# Patient Record
Sex: MALE | Race: WHITE | Employment: OTHER | URBAN - METROPOLITAN AREA
[De-identification: names, ages, dates, MRNs, and addresses within clinical notes are randomized per-mention and may not be internally consistent; named-entity substitution may affect disease eponyms.]

---

## 2017-05-11 ENCOUNTER — OFFICE VISIT (OUTPATIENT)
Dept: FAMILY MEDICINE | Facility: CLINIC | Age: 67
End: 2017-05-11
Payer: COMMERCIAL

## 2017-05-11 VITALS
BODY MASS INDEX: 24.11 KG/M2 | WEIGHT: 150 LBS | SYSTOLIC BLOOD PRESSURE: 109 MMHG | HEART RATE: 78 BPM | DIASTOLIC BLOOD PRESSURE: 68 MMHG | OXYGEN SATURATION: 97 % | TEMPERATURE: 97.2 F | HEIGHT: 66 IN

## 2017-05-11 DIAGNOSIS — E78.5 HYPERLIPIDEMIA LDL GOAL <100: ICD-10-CM

## 2017-05-11 DIAGNOSIS — E11.9 DIABETES MELLITUS WITHOUT COMPLICATION (H): ICD-10-CM

## 2017-05-11 DIAGNOSIS — Z13.6 SCREENING FOR ABDOMINAL AORTIC ANEURYSM: ICD-10-CM

## 2017-05-11 DIAGNOSIS — E11.9 TYPE 2 DIABETES MELLITUS WITHOUT COMPLICATION, WITHOUT LONG-TERM CURRENT USE OF INSULIN (H): Primary | ICD-10-CM

## 2017-05-11 LAB
ALT SERPL W P-5'-P-CCNC: 20 U/L (ref 0–70)
ANION GAP SERPL CALCULATED.3IONS-SCNC: 6 MMOL/L (ref 3–14)
AST SERPL W P-5'-P-CCNC: 11 U/L (ref 0–45)
BUN SERPL-MCNC: 13 MG/DL (ref 7–30)
CALCIUM SERPL-MCNC: 9.6 MG/DL (ref 8.5–10.1)
CHLORIDE SERPL-SCNC: 102 MMOL/L (ref 94–109)
CHOLEST SERPL-MCNC: 141 MG/DL
CO2 SERPL-SCNC: 29 MMOL/L (ref 20–32)
CREAT SERPL-MCNC: 0.88 MG/DL (ref 0.66–1.25)
CREAT UR-MCNC: 36 MG/DL
GFR SERPL CREATININE-BSD FRML MDRD: 85 ML/MIN/1.7M2
GLUCOSE SERPL-MCNC: 109 MG/DL (ref 70–99)
HBA1C MFR BLD: 6.1 % (ref 4.3–6)
HDLC SERPL-MCNC: 40 MG/DL
LDLC SERPL CALC-MCNC: 67 MG/DL
MICROALBUMIN UR-MCNC: <5 MG/L
MICROALBUMIN/CREAT UR: NORMAL MG/G CR (ref 0–17)
NONHDLC SERPL-MCNC: 101 MG/DL
POTASSIUM SERPL-SCNC: 3.9 MMOL/L (ref 3.4–5.3)
SODIUM SERPL-SCNC: 137 MMOL/L (ref 133–144)
TRIGL SERPL-MCNC: 170 MG/DL

## 2017-05-11 PROCEDURE — 84450 TRANSFERASE (AST) (SGOT): CPT | Performed by: FAMILY MEDICINE

## 2017-05-11 PROCEDURE — 80061 LIPID PANEL: CPT | Performed by: FAMILY MEDICINE

## 2017-05-11 PROCEDURE — 83036 HEMOGLOBIN GLYCOSYLATED A1C: CPT | Performed by: FAMILY MEDICINE

## 2017-05-11 PROCEDURE — 99214 OFFICE O/P EST MOD 30 MIN: CPT | Performed by: FAMILY MEDICINE

## 2017-05-11 PROCEDURE — 82043 UR ALBUMIN QUANTITATIVE: CPT | Performed by: FAMILY MEDICINE

## 2017-05-11 PROCEDURE — 84460 ALANINE AMINO (ALT) (SGPT): CPT | Performed by: FAMILY MEDICINE

## 2017-05-11 PROCEDURE — 80048 BASIC METABOLIC PNL TOTAL CA: CPT | Performed by: FAMILY MEDICINE

## 2017-05-11 PROCEDURE — 36415 COLL VENOUS BLD VENIPUNCTURE: CPT | Performed by: FAMILY MEDICINE

## 2017-05-11 RX ORDER — SIMVASTATIN 40 MG
40 TABLET ORAL AT BEDTIME
Qty: 90 TABLET | Refills: 3 | Status: SHIPPED | OUTPATIENT
Start: 2017-05-11 | End: 2018-05-14

## 2017-05-11 RX ORDER — LISINOPRIL AND HYDROCHLOROTHIAZIDE 12.5; 2 MG/1; MG/1
1 TABLET ORAL DAILY
Qty: 90 TABLET | Refills: 3 | Status: SHIPPED | OUTPATIENT
Start: 2017-05-11 | End: 2018-05-03

## 2017-05-11 NOTE — MR AVS SNAPSHOT
After Visit Summary   5/11/2017    Tyson Reese    MRN: [id number]           Patient Information     Date Of Birth          1950        Visit Information        Provider Department      5/11/2017 1:00 PM Edd Carvalho MD [facility]        Today's Diagnoses     Type 2 diabetes mellitus without complication, without long-term current use of insulin (H)    -  1    Hyperlipidemia LDL goal <100        Diabetes mellitus without complication (H)        Screening for abdominal aortic aneurysm           Follow-ups after your visit        Additional Services     OPTOMETRY REFERRAL       Your provider has referred you to: [facility]: [facility] [phone]    [email or web address]    Please be aware that coverage of these services is subject to the terms and limitations of your health insurance plan.  Call member services at your health plan with any benefit or coverage questions.      Please bring the following with you to your appointment:    (1) Any X-Rays, CTs or MRIs which have been performed.  Contact the facility where they were done to arrange for  prior to your scheduled appointment.    (2) List of current medications  (3) This referral request   (4) Any documents/labs given to you for this referral                  Your next 10 appointments already scheduled     Jun 06, 2017 10:40 AM CDT   US ABDOMINAL AORTIC LIMITED with [id number]   [facility]    [address] [place] [address] [phone]           Please bring a list of your medicines (including vitamins, minerals and over-the-counter drugs). Also, tell your doctor about any allergies you may have. Wear comfortable clothes and leave your valuables at home.  Adults: No eating or drinking for 8 hours before the exam. You may take medicine with a small sip of water.  Children: - Children 6+ years: No food or drink  for 6 hours before exam. - Children 1-5 years: No food or drink for 4 hours before exam. - Infants, breast-fed: may have breast milk up to 2 hours before exam. - Infants, formula: may have bottle until 4 hours before exam.  Please call the Imaging Department at your exam site with any questions.            Jun 06, 2017 11:30 AM CDT   [facility] Visit with Heather Wade OD   [facility]    [address] [place] [id number] [phone]            Nov 13, 2017  1:20 PM CST   Office Visit with Edd Carvalho MD   [facility]    [address] [facility] [address] [phone]           Bring a current list of meds and any records pertaining to this visit.  For Physicals, please bring immunization records and any forms needing to be filled out.  Please arrive 10 minutes early to complete paperwork.              Future tests that were ordered for you today     Open Future Orders        Priority Expected Expires Ordered    US abdominal aorta limited Routine  5/11/2018 5/11/2017            Who to contact     If you have questions or need follow up information about today's clinic visit or your schedule please contact [facility] directly at [phone].  Normal or non-critical lab and imaging results will be communicated to you by MyChart, letter or phone within 4 business days after the clinic has received the results. If you do not hear from us within 7 days, please contact the clinic through MyChart or phone. If you have a critical or abnormal lab result, we will notify you by phone as soon as possible.  Submit refill requests through [employer] or call your pharmacy and they will forward the refill request to us. Please allow 3 business days for your refill to be completed.          Additional Information About Your Visit        [employer] Information      "[employer] lets you send messages to your doctor, view your test results, renew your prescriptions, schedule appointments and more. To sign up, go to [email or web address] . Click on \"Log in\" on the left side of the screen, which will take you to the Welcome page. Then click on \"Sign up Now\" on the right side of the page.     You will be asked to enter the access code listed below, as well as some personal information. Please follow the directions to create your username and password.     Your access code is: [email or web address]  Expires: 2017  1:50 PM     Your access code will  in 90 days. If you need help or a new code, please call your [place] clinic or [phone].        Care EveryWhere ID     This is your Care EveryWhere ID. This could be used by other organizations to access your [place] medical records  [id number]        Your Vitals Were     Pulse Temperature Height Pulse Oximetry BMI (Body Mass Index)       78 97.2  F (36.2  C) (Oral) 5' 5.55\" (1.665 m) 97% 24.54 kg/m2        Blood Pressure from Last 3 Encounters:   17 109/68   11/10/16 129/80   16 109/73    Weight from Last 3 Encounters:   17 150 lb (68 kg)   11/10/16 149 lb (67.6 kg)   16 152 lb (68.9 kg)              We Performed the Following     Albumin Random Urine Quantitative     ALT     AST     Basic metabolic panel     Hemoglobin A1c     Lipid panel reflex to direct LDL     OPTOMETRY REFERRAL          Where to get your medicines      These medications were sent to [id number] - [place], [place] - [address]  [address] [facility] [address]     Phone:  [phone]     lisinopril-hydrochlorothiazide 20-12.5 MG per tablet    metFORMIN 500 MG tablet    simvastatin 40 MG tablet          Primary Care Provider Office Phone # Fax #    Edd Carvalho [phone]       [facility] [address] [facility] [address]        Thank you!     " Thank you for choosing [facility]  for your care. Our goal is always to provide you with excellent care. Hearing back from our patients is one way we can continue to improve our services. Please take a few minutes to complete the written survey that you may receive in the mail after your visit with us. Thank you!             Your Updated Medication List - Protect others around you: Learn how to safely use, store and throw away your medicines at www.disposemymeds.org.          This list is accurate as of: 5/11/17  1:50 PM.  Always use your most recent med list.                   Brand Name Dispense Instructions for use    aspirin 81 MG EC tablet      Take 81 mg by mouth daily.       lisinopril-hydrochlorothiazide 20-12.5 MG per tablet    PRINZIDE/ZESTORETIC    90 tablet    Take 1 tablet by mouth daily       metFORMIN 500 MG tablet    GLUCOPHAGE    180 tablet    Take 1 tablet (500 mg) by mouth 2 times daily (with meals)       simvastatin 40 MG tablet    ZOCOR    90 tablet    Take 1 tablet (40 mg) by mouth At Bedtime

## 2017-05-11 NOTE — NURSING NOTE
"Chief Complaint   Patient presents with     Diabetes     Follow up       Initial /68 (BP Location: Left arm, Patient Position: Chair, Cuff Size: Adult Regular)  Pulse 78  Temp 97.2  F (36.2  C) (Oral)  Ht 5' 5.55\" (1.665 m)  Wt 150 lb (68 kg)  SpO2 97%  BMI 24.54 kg/m2 Estimated body mass index is 24.54 kg/(m^2) as calculated from the following:    Height as of this encounter: 5' 5.55\" (1.665 m).    Weight as of this encounter: 150 lb (68 kg).  Medication Reconciliation: complete   Akua See STEFAN Hurley      "

## 2017-05-11 NOTE — PROGRESS NOTES
"  SUBJECTIVE:                                                    Tyson Reese is a 67 year old male who presents to clinic today for the following health issues:        Diabetes Follow-up      Patient is checking blood sugars: not at all    Diabetic concerns: None     Symptoms of hypoglycemia (low blood sugar): none     Paresthesias (numbness or burning in feet) or sores: No     Date of last diabetic eye exam: 2016       Amount of exercise or physical activity: Couple times a week    Problems taking medications regularly: No    Medication side effects: none    Diet: regular (no restrictions)    Ros: no chest pain, chest tightness   No sob   No leg edema   No abdominal pain     Denies fever or chills   Weight stable     No claudication   His left foot hurts at times, but the pain goes away     Current Outpatient Prescriptions   Medication Sig Dispense Refill     simvastatin (ZOCOR) 40 MG tablet Take 1 tablet (40 mg) by mouth At Bedtime 90 tablet 3     metFORMIN (GLUCOPHAGE) 500 MG tablet Take 1 tablet (500 mg) by mouth 2 times daily (with meals) 180 tablet 3     lisinopril-hydrochlorothiazide (PRINZIDE,ZESTORETIC) 20-12.5 MG per tablet Take 1 tablet by mouth daily 90 tablet 3     aspirin 81 MG EC tablet Take 81 mg by mouth daily.       Smokes 1 ppd   Patient declines ct screening for cancer of the lung     O: /68 (BP Location: Left arm, Patient Position: Chair, Cuff Size: Adult Regular)  Pulse 78  Temp 97.2  F (36.2  C) (Oral)  Ht 5' 5.55\" (1.665 m)  Wt 150 lb (68 kg)  SpO2 97%  BMI 24.54 kg/m2    Head: Normocephalic, atraumatic.  Eyes: Conjunctiva clear, non icteric. PERRLA.  Ears: External ears and TMs normal BL.  Nose: Septum midline, nasal mucosa pink and moist. No discharge.  Mouth / Throat: Normal dentition.  No oral lesions. Pharynx non erythematous, tonsils without hypertrophy.  Neck: Supple, no enlarged LN, trachea midline.    No bruits     Chest wall normal to inspection and palpation. Good " excursion bilaterally. Lungs clear to auscultation. Good air movement bilaterally without rales, wheezes, or rhonchi.   Regular rate and  rhythm. S1 and S2 normal, no murmurs, clicks, gallops or rubs. No edema or JVD.    The abdomen is soft without tenderness, guarding, mass, rebound or organomegaly. Bowel sounds are normal. No CVA tenderness or inguinal adenopathy noted.        ICD-10-CM    1. Type 2 diabetes mellitus without complication, without long-term current use of insulin (H) E11.9 Basic metabolic panel     Albumin Random Urine Quantitative     Lipid panel reflex to direct LDL     Hemoglobin A1c     OPTOMETRY REFERRAL   2. Hyperlipidemia LDL goal <100 E78.5 Lipid panel reflex to direct LDL     ALT     AST     simvastatin (ZOCOR) 40 MG tablet   3. Diabetes mellitus without complication (H) E11.9 metFORMIN (GLUCOPHAGE) 500 MG tablet     lisinopril-hydrochlorothiazide (PRINZIDE/ZESTORETIC) 20-12.5 MG per tablet   4. Screening for abdominal aortic aneurysm Z13.6 US abdominal aorta limited     Patient will get his optometry appointment and his AAA screen on the same day     6 month follow up for diabetes     Reviewed and updated as needed this visit by clinical staff  Tobacco  Allergies  Meds  Med Hx  Surg Hx  Fam Hx  Soc Hx      Reviewed and updated as needed this visit by Provider

## 2017-05-11 NOTE — LETTER
[facility]  [address] [place]  [address]  [phone]        May 12, 2017    Tyson Reese  [address] [facility] [address]        Dear Tyson,    Your Hemoglobin A1C shows good control of your diabetes   Your basic metabolic panel which includes electrolytes,kidney function is normal and  -Glucose (diabetic screening test) is mildly elevated   Your cholesterols are at goal     Your liver tests are normal       Follow up in 6 month(s)     Results for orders placed or performed in visit on 05/11/17   Basic metabolic panel   Result Value Ref Range    Sodium 137 133 - 144 mmol/L    Potassium 3.9 3.4 - 5.3 mmol/L    Chloride 102 94 - 109 mmol/L    Carbon Dioxide 29 20 - 32 mmol/L    Anion Gap 6 3 - 14 mmol/L    Glucose 109 (H) 70 - 99 mg/dL    Urea Nitrogen 13 7 - 30 mg/dL    Creatinine 0.88 0.66 - 1.25 mg/dL    GFR Estimate 85 >60 mL/min/1.7m2    GFR Estimate If Black >90   GFR Calc   >60 mL/min/1.7m2    Calcium 9.6 8.5 - 10.1 mg/dL   Albumin Random Urine Quantitative   Result Value Ref Range    Creatinine Urine 36 mg/dL    Albumin Urine mg/L <5 mg/L    Albumin Urine mg/g Cr Unable to calculate due to low value 0 - 17 mg/g Cr   Lipid panel reflex to direct LDL   Result Value Ref Range    Cholesterol 141 <200 mg/dL    Triglycerides 170 (H) <150 mg/dL    HDL Cholesterol 40 >39 mg/dL    LDL Cholesterol Calculated 67 <100 mg/dL    Non HDL Cholesterol 101 <130 mg/dL   ALT   Result Value Ref Range    ALT 20 0 - 70 U/L   AST   Result Value Ref Range    AST 11 0 - 45 U/L   Hemoglobin A1c   Result Value Ref Range    Hemoglobin A1C 6.1 (H) 4.3 - 6.0 %       If you have any questions please call the clinic at [phone].    Sincerely,    Edd STRONGL

## 2017-05-12 NOTE — PROGRESS NOTES
Your Hemoglobin A1C shows good control of your diabetes   Your basic metabolic panel which includes electrolytes,kidney function is normal and  -Glucose (diabetic screening test) is mildly elevated  Your cholesterols are at goal     Your liver tests are normal         Follow up in 6 month(s)

## 2017-05-29 DIAGNOSIS — E78.5 HYPERLIPIDEMIA LDL GOAL <100: ICD-10-CM

## 2017-05-29 DIAGNOSIS — E11.9 DIABETES MELLITUS WITHOUT COMPLICATION (H): ICD-10-CM

## 2017-05-30 RX ORDER — SIMVASTATIN 40 MG
TABLET ORAL
Qty: 90 TABLET | Refills: 3 | OUTPATIENT
Start: 2017-05-30

## 2017-05-30 RX ORDER — LISINOPRIL AND HYDROCHLOROTHIAZIDE 12.5; 2 MG/1; MG/1
TABLET ORAL
Qty: 90 TABLET | Refills: 3 | OUTPATIENT
Start: 2017-05-30

## 2017-05-30 NOTE — TELEPHONE ENCOUNTER
simvastatin (ZOCOR) 40 MG tablet     Last Written Prescription Date: 5/11/17  Last Fill Quantity: 90, # refills: 3  Last [facility] with [facility] prescribing provider: 5/11/17       Lab Results   Component Value Date    CHOL 141 05/11/2017     Lab Results   Component Value Date    HDL 40 05/11/2017     Lab Results   Component Value Date    LDL 67 05/11/2017     Lab Results   Component Value Date    TRIG 170 05/11/2017     Lab Results   Component Value Date    CHOLHDLRATIO 3.0 11/09/2015     lisinopril-hydrochlorothiazide (PRINZIDE/ZESTORETIC) 20-12.5 MG per tablet      Last Written Prescription Date: 5/11/17  Last Fill Quantity: 90, # refills: 3  Last Office Visit with [facility] or [facility] prescribing provider: 5/11/17       Potassium   Date Value Ref Range Status   05/11/2017 3.9 3.4 - 5.3 mmol/L Final     Creatinine   Date Value Ref Range Status   05/11/2017 0.88 0.66 - 1.25 mg/dL Final     BP Readings from Last 3 Encounters:   05/11/17 109/68   11/10/16 129/80   05/09/16 109/73

## 2017-06-06 ENCOUNTER — OFFICE VISIT (OUTPATIENT)
Dept: OPTOMETRY | Facility: CLINIC | Age: 67
End: 2017-06-06
Payer: COMMERCIAL

## 2017-06-06 ENCOUNTER — RADIANT APPOINTMENT (OUTPATIENT)
Dept: ULTRASOUND IMAGING | Facility: CLINIC | Age: 67
End: 2017-06-06
Attending: FAMILY MEDICINE
Payer: COMMERCIAL

## 2017-06-06 DIAGNOSIS — Z13.6 SCREENING FOR ABDOMINAL AORTIC ANEURYSM: ICD-10-CM

## 2017-06-06 DIAGNOSIS — H26.9 CATARACTS, BOTH EYES: ICD-10-CM

## 2017-06-06 DIAGNOSIS — H52.13 MYOPIA WITH PRESBYOPIA OF BOTH EYES: ICD-10-CM

## 2017-06-06 DIAGNOSIS — H52.202 ASTIGMATISM OF LEFT EYE: ICD-10-CM

## 2017-06-06 DIAGNOSIS — D31.31 CHOROIDAL NEVUS OF RIGHT EYE: ICD-10-CM

## 2017-06-06 DIAGNOSIS — H52.4 MYOPIA WITH PRESBYOPIA OF BOTH EYES: ICD-10-CM

## 2017-06-06 DIAGNOSIS — E11.9 TYPE 2 DIABETES MELLITUS WITHOUT RETINOPATHY (H): ICD-10-CM

## 2017-06-06 DIAGNOSIS — Z01.00 EXAMINATION OF EYES AND VISION: Primary | ICD-10-CM

## 2017-06-06 DIAGNOSIS — H02.826 CYST OF LEFT EYELID: ICD-10-CM

## 2017-06-06 PROCEDURE — 76775 US EXAM ABDO BACK WALL LIM: CPT

## 2017-06-06 PROCEDURE — 92015 DETERMINE REFRACTIVE STATE: CPT | Performed by: OPTOMETRIST

## 2017-06-06 PROCEDURE — 92014 COMPRE OPH EXAM EST PT 1/>: CPT | Performed by: OPTOMETRIST

## 2017-06-06 ASSESSMENT — CONF VISUAL FIELD
OS_NORMAL: 1
OD_NORMAL: 1

## 2017-06-06 ASSESSMENT — VISUAL ACUITY
OS_SC: 20/60
OD_SC: 20/40
CORRECTION_TYPE: GLASSES
OS_SC: 20/40
OD_CC: 20/30
METHOD: SNELLEN - LINEAR
OD_SC+: -1
OS_CC: 20/30 -2
OS_CC: 20/30
OD_CC: 20/30 -2
OD_SC: 20/40

## 2017-06-06 ASSESSMENT — REFRACTION_WEARINGRX
OS_ADD: +2.75
OD_CYLINDER: +0.25
OS_AXIS: 162
OS_CYLINDER: +0.50
OS_SPHERE: -0.75
OD_ADD: +2.75
OD_AXIS: 109
SPECS_TYPE: BIFOCAL
OD_SPHERE: -1.00

## 2017-06-06 ASSESSMENT — EXTERNAL EXAM - RIGHT EYE: OD_EXAM: NORMAL

## 2017-06-06 ASSESSMENT — TONOMETRY
OS_IOP_MMHG: 18
OD_IOP_MMHG: 18
IOP_METHOD: APPLANATION

## 2017-06-06 ASSESSMENT — REFRACTION_MANIFEST
OD_ADD: +2.50
OS_AXIS: 160
OS_CYLINDER: +0.75
OD_SPHERE: -1.00
OS_ADD: +2.50
OD_CYLINDER: SPHERE
OS_SPHERE: -0.75

## 2017-06-06 ASSESSMENT — CUP TO DISC RATIO
OS_RATIO: 0.1
OD_RATIO: 0.1

## 2017-06-06 ASSESSMENT — SLIT LAMP EXAM - LIDS: COMMENTS: 2+ DERMATOCHALASIS - UPPER LID

## 2017-06-06 ASSESSMENT — EXTERNAL EXAM - LEFT EYE: OS_EXAM: NORMAL

## 2017-06-06 NOTE — LETTER
[facility]  Optometry Department      6/6/2017    Re:  Tyson Reese  1950   [id number]    Dear Dr. Carvalho,    Your patient was seen in our office on 6/6/2017 for a dilated diabetic eye exam.      Findings:    No Retinopathy  OU - Both  Mild cataracts both eyes   Choroidal nevus of right eye  Cyst of left eyelid, lower    Comments:  Tyson should return for a comprehensive eye exam in 1 year.      Sincerely,        Heather Wade O.D  [address] [place]  [address]    [phone]

## 2017-06-06 NOTE — MR AVS SNAPSHOT
After Visit Summary   6/6/2017    Tyson Reese    MRN: [id number]           Patient Information     Date Of Birth          1950        Visit Information        Provider Department      6/6/2017 11:30 AM Heather Wade OD [facility]        Today's Diagnoses     Examination of eyes and vision    -  1    Type 2 diabetes mellitus without retinopathy (H)        Cataracts, both eyes        Choroidal nevus of right eye        Cyst of left eyelid        Myopia with presbyopia of both eyes        Astigmatism of left eye          Care Instructions        Monitor, Keep blood sugar under control  Sent letter to primary care provider regarding diabetes  Monitor cataracts and choroidal nevus by having yearly exams.  Wear sunglasses when outside.  A final glasses prescription was given.   No need to change the glasses.  Return to clinic 1 year for Comprehensive Vision Exam      Heather Wade O.D  [address]  [place]  [address] [phone]                Follow-ups after your visit        Follow-up notes from your care team     Return in about 1 year (around 6/6/2018) for Eye Exam.      Your next 10 appointments already scheduled     Nov 13, 2017  1:20 PM CST   [facility]    [address] [facility] [address] [phone]           Bring a current list of meds and any records pertaining to this visit.  For Physicals, please bring immunization records and any forms needing to be filled out.  Please arrive 10 minutes early to complete paperwork.              Who to contact     If you have questions or need follow up information about today's clinic visit or your schedule please contact [facility] directly at [phone].  Normal or non-critical lab and imaging results will be communicated to you by  "MyChart, letter or phone within 4 business days after the clinic has received the results. If you do not hear from us within 7 days, please contact the clinic through [employer] or phone. If you have a critical or abnormal lab result, we will notify you by phone as soon as possible.  Submit refill requests through [employer] or call your pharmacy and they will forward the refill request to us. Please allow 3 business days for your refill to be completed.          Additional Information About Your Visit        [employer] Information     [employer] lets you send messages to your doctor, view your test results, renew your prescriptions, schedule appointments and more. To sign up, go to [email or web address] . Click on \"Log in\" on the left side of the screen, which will take you to the Welcome page. Then click on \"Sign up Now\" on the right side of the page.     You will be asked to enter the access code listed below, as well as some personal information. Please follow the directions to create your username and password.     Your access code is: [email or web address]  Expires: 2017  1:50 PM     Your access code will  in 90 days. If you need help or a new code, please call your [place] clinic or [phone].        Care EveryWhere ID     This is your Care EveryWhere ID. This could be used by other organizations to access your [place] medical records  [id number]         Blood Pressure from Last 3 Encounters:   17 109/68   11/10/16 129/80   16 109/73    Weight from Last 3 Encounters:   17 68 kg (150 lb)   11/10/16 67.6 kg (149 lb)   16 68.9 kg (152 lb)              We Performed the Following     EYE EXAM (SIMPLE-NONBILLABLE)     REFRACTION        Primary Care Provider Office Phone # Fax #    Edd Carvalho [phone]       [facility] [address] [facility] [address]        Thank you!     Thank you for choosing [facility]  for your care. " Our goal is always to provide you with excellent care. Hearing back from our patients is one way we can continue to improve our services. Please take a few minutes to complete the written survey that you may receive in the mail after your visit with us. Thank you!             Your Updated Medication List - Protect others around you: Learn how to safely use, store and throw away your medicines at www.disposemymeds.org.          This list is accurate as of: 6/6/17 12:33 PM.  Always use your most recent med list.                   Brand Name Dispense Instructions for use    aspirin 81 MG EC tablet      Take 81 mg by mouth daily.       lisinopril-hydrochlorothiazide 20-12.5 MG per tablet    PRINZIDE/ZESTORETIC    90 tablet    Take 1 tablet by mouth daily       metFORMIN 500 MG tablet    GLUCOPHAGE    180 tablet    Take 1 tablet (500 mg) by mouth 2 times daily (with meals)       simvastatin 40 MG tablet    ZOCOR    90 tablet    Take 1 tablet (40 mg) by mouth At Bedtime

## 2017-06-06 NOTE — LETTER
[facility]   [address] [place] [place]  [address]  [phone]                                   June 6, 2017    Tyson Reese  [address] [facility] [address]        Dear Tyson,    Your ultrasound of your aorta is normal     Results for orders placed or performed in visit on 06/06/17   US abdominal aorta limited    Narrative    ULTRASOUND  OF THE ABDOMINAL AORTA  6/6/2017 11:05 AM     HISTORY: Encounter for screening for cardiovascular disorders    COMPARISON: None.    FINDINGS:  The abdominal aorta is normal in caliber with no aneurysm.   The common iliac arteries are normal in caliber with no aneurysm.      Impression    IMPRESSION:  Normal ultrasound of the abdominal aorta.  Maximum  diameter of the abdominal aorta is 2.7 cm.    MELISSA HENRY MD       If you have any questions please call the clinic at [phone]    Sincerely,    Edd Carvalho MD  bmd

## 2017-06-06 NOTE — PATIENT INSTRUCTIONS
Monitor, Keep blood sugar under control  Sent letter to primary care provider regarding diabetes  Monitor cataracts and choroidal nevus by having yearly exams.  Wear sunglasses when outside.  A final glasses prescription was given.   No need to change the glasses.  Return to clinic 1 year for Comprehensive Vision Exam      Heather Wade O.D  [address] [facility] [place]  [address]    [phone]

## 2017-06-06 NOTE — PROGRESS NOTES
Chief Complaint   Patient presents with     Diabetic Eye Exam     Accompanied by self  Lab Results   Component Value Date    A1C 6.1 05/11/2017    A1C 6.1 11/10/2016    A1C 6.1 05/09/2016    A1C 6.4 11/09/2015    A1C 6.4 05/11/2015       Last Eye Exam: 1 year ago  Dilated Previously: Yes    What are you currently using to see?  glasses    Distance Vision Acuity: Satisfied with vision    Near Vision Acuity: Satisfied with vision while reading  with glasses    Eye Comfort: dry and itchy  Do you use eye drops? : No  Occupation or Hobbies: retired    Zoe Lion, [employer]     Medical, surgical and family histories reviewed and updated 6/6/2017.       OBJECTIVE: See Ophthalmology exam    ASSESSMENT:    ICD-10-CM    1. Examination of eyes and vision Z01.00 EYE EXAM (SIMPLE-NONBILLABLE)     REFRACTION   2. Type 2 diabetes mellitus without retinopathy (H) E11.9 EYE EXAM (SIMPLE-NONBILLABLE)   3. Cataracts, both eyes H26.9 EYE EXAM (SIMPLE-NONBILLABLE)   4. Choroidal nevus of right eye D31.31 EYE EXAM (SIMPLE-NONBILLABLE)   5. Cyst of left eyelid H02.826 EYE EXAM (SIMPLE-NONBILLABLE)   6. Myopia with presbyopia of both eyes H52.13 EYE EXAM (SIMPLE-NONBILLABLE)    H52.4 REFRACTION   7. Astigmatism of left eye H52.202 EYE EXAM (SIMPLE-NONBILLABLE)     REFRACTION      PLAN:  Monitor, Keep blood sugar under control  Sent letter to primary care provider regarding diabetes.  Tyson hubbard  eye exam results will be sent to Edd Carvalho  Monitor cataracts and choroidal nevus by having yearly exams.  Wear sunglasses when outside.  A final glasses prescription was given.  No need to change the glasses.  Return to clinic 1 year for Comprehensive Vision Exam      Heather Wade O.D  [facility]  [address] [place] [address]. [place]  [address]    [phone]

## 2017-11-13 ENCOUNTER — OFFICE VISIT (OUTPATIENT)
Dept: FAMILY MEDICINE | Facility: CLINIC | Age: 67
End: 2017-11-13
Payer: COMMERCIAL

## 2017-11-13 VITALS
BODY MASS INDEX: 24.54 KG/M2 | SYSTOLIC BLOOD PRESSURE: 124 MMHG | OXYGEN SATURATION: 98 % | TEMPERATURE: 96.6 F | HEART RATE: 64 BPM | WEIGHT: 150 LBS | DIASTOLIC BLOOD PRESSURE: 77 MMHG

## 2017-11-13 DIAGNOSIS — E11.8 TYPE 2 DIABETES MELLITUS WITH COMPLICATION, WITHOUT LONG-TERM CURRENT USE OF INSULIN (H): Primary | ICD-10-CM

## 2017-11-13 LAB
ANION GAP SERPL CALCULATED.3IONS-SCNC: 15 MMOL/L (ref 3–14)
BUN SERPL-MCNC: 18 MG/DL (ref 7–30)
CALCIUM SERPL-MCNC: 8.9 MG/DL (ref 8.5–10.1)
CHLORIDE SERPL-SCNC: 100 MMOL/L (ref 94–109)
CO2 SERPL-SCNC: 22 MMOL/L (ref 20–32)
CREAT SERPL-MCNC: 1 MG/DL (ref 0.66–1.25)
GFR SERPL CREATININE-BSD FRML MDRD: 74 ML/MIN/1.7M2
GLUCOSE SERPL-MCNC: 89 MG/DL (ref 70–99)
HBA1C MFR BLD: 5.8 % (ref 4.3–6)
POTASSIUM SERPL-SCNC: 4.2 MMOL/L (ref 3.4–5.3)
SODIUM SERPL-SCNC: 137 MMOL/L (ref 133–144)
TSH SERPL DL<=0.005 MIU/L-ACNC: 1 MU/L (ref 0.4–4)

## 2017-11-13 PROCEDURE — 84443 ASSAY THYROID STIM HORMONE: CPT | Performed by: FAMILY MEDICINE

## 2017-11-13 PROCEDURE — 99214 OFFICE O/P EST MOD 30 MIN: CPT | Performed by: FAMILY MEDICINE

## 2017-11-13 PROCEDURE — 80048 BASIC METABOLIC PNL TOTAL CA: CPT | Performed by: FAMILY MEDICINE

## 2017-11-13 PROCEDURE — 99207 C FOOT EXAM  NO CHARGE: CPT | Mod: 25 | Performed by: FAMILY MEDICINE

## 2017-11-13 PROCEDURE — 83036 HEMOGLOBIN GLYCOSYLATED A1C: CPT | Performed by: FAMILY MEDICINE

## 2017-11-13 PROCEDURE — 36415 COLL VENOUS BLD VENIPUNCTURE: CPT | Performed by: FAMILY MEDICINE

## 2017-11-13 NOTE — NURSING NOTE
"Chief Complaint   Patient presents with     Diabetes     Follow up       Initial /77 (BP Location: Right arm, Patient Position: Sitting, Cuff Size: Adult Regular)  Pulse 64  Temp 96.6  F (35.9  C) (Oral)  Wt 150 lb (68 kg)  SpO2 98%  BMI 24.54 kg/m2 Estimated body mass index is 24.54 kg/(m^2) as calculated from the following:    Height as of 5/11/17: 5' 5.55\" (1.665 m).    Weight as of this encounter: 150 lb (68 kg).  Medication Reconciliation: complete   Maria Ines Zapata MA      "

## 2017-11-13 NOTE — LETTER
[facility]   [address] [facility] [place]  [address]  [phone]                                   November 14, 2017    Tyson Reese  [address] [facility] [address]        Dear Tyson,    Your Hemoglobin A1C is in the normal range   Your thyroid is normal   Your basic metabolic panel which includes electrolytes,kidney function is normal and  -Glucose (diabetic screening test) is within normal limits    Follow up in 6 month(s)    Results for orders placed or performed in visit on 11/13/17   Basic metabolic panel   Result Value Ref Range    Sodium 137 133 - 144 mmol/L    Potassium 4.2 3.4 - 5.3 mmol/L    Chloride 100 94 - 109 mmol/L    Carbon Dioxide 22 20 - 32 mmol/L    Anion Gap 15 (H) 3 - 14 mmol/L    Glucose 89 70 - 99 mg/dL    Urea Nitrogen 18 7 - 30 mg/dL    Creatinine 1.00 0.66 - 1.25 mg/dL    GFR Estimate 74 >60 mL/min/1.7m2    GFR Estimate If Black 90 >60 mL/min/1.7m2    Calcium 8.9 8.5 - 10.1 mg/dL   Hemoglobin A1c   Result Value Ref Range    Hemoglobin A1C 5.8 4.3 - 6.0 %   TSH with free T4 reflex   Result Value Ref Range    TSH 1.00 0.40 - 4.00 mU/L       If you have any questions please call the clinic at [phone]    Sincerely,    Edd Carvalho MD  bmd

## 2017-11-13 NOTE — PROGRESS NOTES
SUBJECTIVE:   Tyson Reese is a 67 year old male who presents to clinic today for the following health issues:    Diabetes Follow-up      Patient is checking blood sugars: not at all    Diabetic concerns: None     Symptoms of hypoglycemia (low blood sugar): none     Paresthesias (numbness or burning in feet) or sores: No     Date of last diabetic eye exam: 6/6/17        Amount of exercise or physical activity: 3-4 days/week for about a mile    Problems taking medications regularly: No    Medication side effects: none  Diet: regular (no restrictions)    Current Outpatient Prescriptions   Medication Sig Dispense Refill     simvastatin (ZOCOR) 40 MG tablet Take 1 tablet (40 mg) by mouth At Bedtime 90 tablet 3     metFORMIN (GLUCOPHAGE) 500 MG tablet Take 1 tablet (500 mg) by mouth 2 times daily (with meals) 180 tablet 3     lisinopril-hydrochlorothiazide (PRINZIDE/ZESTORETIC) 20-12.5 MG per tablet Take 1 tablet by mouth daily 90 tablet 3     aspirin 81 MG EC tablet Take 81 mg by mouth daily.        nocturia x2     Ros: no chest pain, chest tightness   No sob   No leg edema   No abdominal pain     Denies fever or chills   Weight stable     Hard of hearing   Patient smokes 2-3 cigarettes per day   He states his 2 brothers smoke and they live with him       Patient states he is trying to quit   He has tried nicotine gum   Given another card to pick this up     O: /77 (BP Location: Right arm, Patient Position: Sitting, Cuff Size: Adult Regular)  Pulse 64  Temp 96.6  F (35.9  C) (Oral)  Wt 150 lb (68 kg)  SpO2 98%  BMI 24.54 kg/m2    Patient is hard of hearing     Patient smells of smoke     No bruits in the neck       Chest wall normal to inspection and palpation. Good excursion bilaterally. Lungs clear to auscultation. Good air movement bilaterally without rales, wheezes, or rhonchi.   Regular rate and  rhythm. S1 and S2 normal, no murmurs, clicks, gallops or rubs. No edema or JVD.     normal DP and PT  pulses, no trophic changes or ulcerative lesions, normal sensory exam and normal monofilament exam       ICD-10-CM    1. Type 2 diabetes mellitus with complication, without long-term current use of insulin (H) E11.8 Basic metabolic panel     Hemoglobin A1c     TSH with free T4 reflex     FOOT EXAM       Patient has enough medication refills to last until 5/2018  This is when we should check him next

## 2017-11-13 NOTE — MR AVS SNAPSHOT
After Visit Summary   11/13/2017    Tyson Reese    MRN: [id number]           Patient Information     Date Of Birth          1950        Visit Information        Provider Department      11/13/2017 1:20 PM Edd Carvalho MD [facility]        Today's Diagnoses     Type 2 diabetes mellitus with complication, without long-term current use of insulin (H)    -  1       Follow-ups after your visit        Follow-up notes from your care team     Return in about 6 months (around 5/13/2018).      Your next 10 appointments already scheduled     May 14, 2018  1:20 PM CDT   Office Visit with Edd Carvalho MD   [facility]    [address] [place] [id number] [phone]           Bring a current list of meds and any records pertaining to this visit. For Physicals, please bring immunization records and any forms needing to be filled out. Please arrive 10 minutes early to complete paperwork.              Who to contact     If you have questions or need follow up information about today's clinic visit or your schedule please contact [facility] directly at [phone].  Normal or non-critical lab and imaging results will be communicated to you by MyChart, letter or phone within 4 business days after the clinic has received the results. If you do not hear from us within 7 days, please contact the clinic through MyChart or phone. If you have a critical or abnormal lab result, we will notify you by phone as soon as possible.  Submit refill requests through [employer] or call your pharmacy and they will forward the refill request to us. Please allow 3 business days for your refill to be completed.          Additional Information About Your Visit        MyChart Information     [employer] lets you send messages to your doctor, view your test results, renew your prescriptions,  "schedule appointments and more. To sign up, go to [email or web address] . Click on \"Log in\" on the left side of the screen, which will take you to the Welcome page. Then click on \"Sign up Now\" on the right side of the page.     You will be asked to enter the access code listed below, as well as some personal information. Please follow the directions to create your username and password.     Your access code is: [email or web address]  Expires: 2018  2:03 PM     Your access code will  in 90 days. If you need help or a new code, please call your [facility] clinic or [phone].        Care EveryWhere ID     This is your Care EveryWhere ID. This could be used by other organizations to access your [facility] medical records  [id number]        Your Vitals Were     Pulse Temperature Pulse Oximetry BMI (Body Mass Index)          64 96.6  F (35.9  C) (Oral) 98% 24.54 kg/m2         Blood Pressure from Last 3 Encounters:   17 124/77   17 109/68   11/10/16 129/80    Weight from Last 3 Encounters:   17 150 lb (68 kg)   17 150 lb (68 kg)   11/10/16 149 lb (67.6 kg)              We Performed the Following     Basic metabolic panel     FOOT EXAM     Hemoglobin A1c     TSH with free T4 reflex        Primary Care Provider Office Phone # Fax #    Edd BALA Carvalho [phone]       [address] [facility] [address]        Equal Access to Services     [facility]: Hadii kirk whitten hadasho Sokeerthiali, waaxda luqadaha, qaybta kaalmada vesna, bang riojas . So [facility] [phone].    ATENCIÓN: Si habla español, tiene a frias disposición servicios gratuitos de asistencia lingüística. Llame al [phone].    We comply with applicable federal civil rights laws and [place] laws. We do not discriminate on the basis of race, color, national origin, age, disability, sex, sexual orientation, or gender identity.            Thank you!     Thank you for choosing " [facility]  for your care. Our goal is always to provide you with excellent care. Hearing back from our patients is one way we can continue to improve our services. Please take a few minutes to complete the written survey that you may receive in the mail after your visit with us. Thank you!             Your Updated Medication List - Protect others around you: Learn how to safely use, store and throw away your medicines at www.disposemymeds.org.          This list is accurate as of: 11/13/17  2:08 PM.  Always use your most recent med list.                   Brand Name Dispense Instructions for use Diagnosis    aspirin 81 MG EC tablet      Take 81 mg by mouth daily.        lisinopril-hydrochlorothiazide 20-12.5 MG per tablet    PRINZIDE/ZESTORETIC    90 tablet    Take 1 tablet by mouth daily    Diabetes mellitus without complication (H)       metFORMIN 500 MG tablet    GLUCOPHAGE    180 tablet    Take 1 tablet (500 mg) by mouth 2 times daily (with meals)    Diabetes mellitus without complication (H)       simvastatin 40 MG tablet    ZOCOR    90 tablet    Take 1 tablet (40 mg) by mouth At Bedtime    Hyperlipidemia LDL goal <100

## 2017-11-14 NOTE — PROGRESS NOTES
Your Hemoglobin A1C is in the normal range   Your thyroid is normal   Your basic metabolic panel which includes electrolytes,kidney function is normal and  -Glucose (diabetic screening test) is within normal limits    Follow up in 6 month(s)

## 2018-05-03 DIAGNOSIS — E11.9 DIABETES MELLITUS WITHOUT COMPLICATION (H): ICD-10-CM

## 2018-05-03 NOTE — TELEPHONE ENCOUNTER
"Requested Prescriptions   Pending Prescriptions Disp Refills     lisinopril-hydrochlorothiazide (PRINZIDE/ZESTORETIC) 20-12.5 MG per tablet [Pharmacy Med Name: LISINOPRIL-HCTZ 20-12.5 MG TAB] 90 tablet 3    Last Written Prescription Date:  5-11-17  Last Fill Quantity: 90,  # refills: 3   Last office visit: 11/13/2017 with prescribing provider:     Future Office Visit:   Next 5 appointments (look out 90 days)     May 14, 2018  1:20 PM CDT   Office Visit with Edd Carvalho MD   [facility]    [address] [facility] [id number] [phone]                  Sig: TAKE 1 TABLET BY MOUTH DAILY    Diuretics (Including Combos) Protocol Passed    5/3/2018 12:24 PM       Passed - Blood pressure under 140/90 in past 12 months    BP Readings from Last 3 Encounters:   11/13/17 124/77   05/11/17 109/68   11/10/16 129/80                Passed - Recent (12 mo) or future (30 days) visit within the authorizing provider's specialty    Patient had office visit in the last 12 months or has a visit in the next 30 days with authorizing provider or within the authorizing provider's specialty.  See \"Patient Info\" tab in inbasket, or \"Choose Columns\" in Meds & Orders section of the refill encounter.           Passed - Patient is age 18 or older       Passed - Normal serum creatinine on file in past 12 months    Recent Labs   Lab Test  11/13/17   1413   CR  1.00             Passed - Normal serum potassium on file in past 12 months    Recent Labs   Lab Test  11/13/17   1413   POTASSIUM  4.2                   Passed - Normal serum sodium on file in past 12 months    Recent Labs   Lab Test  11/13/17   1413   NA  137                "

## 2018-05-04 RX ORDER — LISINOPRIL AND HYDROCHLOROTHIAZIDE 12.5; 2 MG/1; MG/1
TABLET ORAL
Qty: 90 TABLET | Refills: 3 | Status: SHIPPED | OUTPATIENT
Start: 2018-05-04 | End: 2019-05-26

## 2018-05-04 NOTE — TELEPHONE ENCOUNTER
Routing refill request to provider for review/approval because:  Drug not on the [facility] refill protocol for diabetes      Phyllis Queen RN  [facility]

## 2018-05-14 ENCOUNTER — OFFICE VISIT (OUTPATIENT)
Dept: FAMILY MEDICINE | Facility: CLINIC | Age: 68
End: 2018-05-14
Payer: COMMERCIAL

## 2018-05-14 VITALS
HEART RATE: 79 BPM | TEMPERATURE: 97 F | WEIGHT: 156 LBS | HEIGHT: 66 IN | OXYGEN SATURATION: 97 % | SYSTOLIC BLOOD PRESSURE: 105 MMHG | DIASTOLIC BLOOD PRESSURE: 66 MMHG | BODY MASS INDEX: 25.07 KG/M2

## 2018-05-14 DIAGNOSIS — F17.200 TOBACCO USE DISORDER: ICD-10-CM

## 2018-05-14 DIAGNOSIS — H25.89 OTHER AGE-RELATED CATARACT OF BOTH EYES: ICD-10-CM

## 2018-05-14 DIAGNOSIS — E11.9 DIABETES MELLITUS WITHOUT COMPLICATION (H): ICD-10-CM

## 2018-05-14 DIAGNOSIS — E11.9 TYPE 2 DIABETES MELLITUS WITHOUT COMPLICATION, WITHOUT LONG-TERM CURRENT USE OF INSULIN (H): Primary | ICD-10-CM

## 2018-05-14 DIAGNOSIS — E78.5 HYPERLIPIDEMIA LDL GOAL <100: ICD-10-CM

## 2018-05-14 DIAGNOSIS — Z87.891 HISTORY OF TOBACCO USE: ICD-10-CM

## 2018-05-14 LAB
ALT SERPL W P-5'-P-CCNC: 19 U/L (ref 0–70)
ANION GAP SERPL CALCULATED.3IONS-SCNC: 10 MMOL/L (ref 3–14)
AST SERPL W P-5'-P-CCNC: 28 U/L (ref 0–45)
BUN SERPL-MCNC: 17 MG/DL (ref 7–30)
CALCIUM SERPL-MCNC: 9.4 MG/DL (ref 8.5–10.1)
CHLORIDE SERPL-SCNC: 103 MMOL/L (ref 94–109)
CHOLEST SERPL-MCNC: 133 MG/DL
CK SERPL-CCNC: 68 U/L (ref 30–300)
CO2 SERPL-SCNC: 22 MMOL/L (ref 20–32)
CREAT SERPL-MCNC: 0.99 MG/DL (ref 0.66–1.25)
GFR SERPL CREATININE-BSD FRML MDRD: 75 ML/MIN/1.7M2
GLUCOSE SERPL-MCNC: 98 MG/DL (ref 70–99)
HBA1C MFR BLD: 6.2 % (ref 0–5.6)
HDLC SERPL-MCNC: 41 MG/DL
LDLC SERPL CALC-MCNC: 57 MG/DL
NONHDLC SERPL-MCNC: 92 MG/DL
POTASSIUM SERPL-SCNC: NORMAL MMOL/L (ref 3.4–5.3)
SODIUM SERPL-SCNC: 135 MMOL/L (ref 133–144)
TRIGL SERPL-MCNC: 176 MG/DL

## 2018-05-14 PROCEDURE — 84450 TRANSFERASE (AST) (SGOT): CPT | Performed by: FAMILY MEDICINE

## 2018-05-14 PROCEDURE — 82043 UR ALBUMIN QUANTITATIVE: CPT | Performed by: FAMILY MEDICINE

## 2018-05-14 PROCEDURE — 83036 HEMOGLOBIN GLYCOSYLATED A1C: CPT | Performed by: FAMILY MEDICINE

## 2018-05-14 PROCEDURE — 84460 ALANINE AMINO (ALT) (SGPT): CPT | Performed by: FAMILY MEDICINE

## 2018-05-14 PROCEDURE — 80048 BASIC METABOLIC PNL TOTAL CA: CPT | Performed by: FAMILY MEDICINE

## 2018-05-14 PROCEDURE — 99214 OFFICE O/P EST MOD 30 MIN: CPT | Performed by: FAMILY MEDICINE

## 2018-05-14 PROCEDURE — 80061 LIPID PANEL: CPT | Performed by: FAMILY MEDICINE

## 2018-05-14 PROCEDURE — 82550 ASSAY OF CK (CPK): CPT | Performed by: FAMILY MEDICINE

## 2018-05-14 PROCEDURE — 36415 COLL VENOUS BLD VENIPUNCTURE: CPT | Performed by: FAMILY MEDICINE

## 2018-05-14 PROCEDURE — G0296 VISIT TO DETERM LDCT ELIG: HCPCS | Performed by: FAMILY MEDICINE

## 2018-05-14 RX ORDER — SIMVASTATIN 40 MG
40 TABLET ORAL AT BEDTIME
Qty: 90 TABLET | Refills: 3 | Status: SHIPPED | OUTPATIENT
Start: 2018-05-14 | End: 2019-06-05

## 2018-05-14 NOTE — PATIENT INSTRUCTIONS

## 2018-05-14 NOTE — PROGRESS NOTES
"  SUBJECTIVE:   Tyson Reese is a 68 year old male who presents to clinic today for the following health issues:      Diabetes Follow-up      Patient is checking blood sugars: not at all    Diabetic concerns: None     Symptoms of hypoglycemia (low blood sugar): none     Paresthesias (numbness or burning in feet) or sores: No     Date of last diabetic eye exam: 6/6/17    BP Readings from Last 2 Encounters:   11/13/17 124/77   05/11/17 109/68     Hemoglobin A1C (%)   Date Value   11/13/2017 5.8   05/11/2017 6.1 (H)     LDL Cholesterol Calculated (mg/dL)   Date Value   05/11/2017 67   05/09/2016 47       Amount of exercise or physical activity: None    Problems taking medications regularly: No    Medication side effects: none  Diet: regular (no restrictions)    Current Outpatient Prescriptions   Medication Sig Dispense Refill     aspirin 81 MG EC tablet Take 81 mg by mouth daily.       lisinopril-hydrochlorothiazide (PRINZIDE/ZESTORETIC) 20-12.5 MG per tablet TAKE 1 TABLET BY MOUTH DAILY 90 tablet 3     metFORMIN (GLUCOPHAGE) 500 MG tablet Take 1 tablet (500 mg) by mouth 2 times daily (with meals) 180 tablet 3     simvastatin (ZOCOR) 40 MG tablet Take 1 tablet (40 mg) by mouth At Bedtime 90 tablet 3     He is not checking his blood sugars at all     He does not have a machine     Ros: no chest pain, chest tightness   No sob   No leg edema   No abdominal pain     Denies fever or chills   Weight stable       Sometimes gets numbness but this is occasional   Feet are good     Patient states he has to slow down after two blocks; this only happens occasionally and the distance is getting shorter.      Problem list and histories reviewed & adjusted, as indicated.      O; /66 (BP Location: Right arm, Patient Position: Sitting, Cuff Size: Adult Regular)  Pulse 79  Temp 97  F (36.1  C) (Oral)  Ht 5' 5.5\" (1.664 m)  Wt 156 lb (70.8 kg)  SpO2 97%  BMI 25.56 kg/m2    Wt Readings from Last 4 Encounters:   05/14/18 156 lb " (70.8 kg)   11/13/17 150 lb (68 kg)   05/11/17 150 lb (68 kg)   11/10/16 149 lb (67.6 kg)     Seems hard of hearing   Missing a lot of teeth   He states he sees a dentist and get them cleaned every 5 months     Due for eye exam     Chest wall normal to inspection and palpation. Good excursion bilaterally. Lungs clear to auscultation. Good air movement bilaterally without rales, wheezes, or rhonchi.   Regular rate and  rhythm. S1 and S2 normal, no murmurs, clicks, gallops or rubs. No edema or JVD.    The abdomen is soft without tenderness, guarding, mass, rebound or organomegaly. Bowel sounds are normal. No CVA tenderness or inguinal adenopathy noted.     normal DP and PT pulses, no trophic changes or ulcerative lesions, normal sensory exam and normal monofilament exam    Patient smells of smoke       ICD-10-CM    1. Type 2 diabetes mellitus without complication, without long-term current use of insulin (H) E11.9    2. Hyperlipidemia LDL goal <100 E78.5 simvastatin (ZOCOR) 40 MG tablet   3. Tobacco use disorder F17.200    4. Other age-related cataract of both eyes H25.89    5. Diabetes mellitus without complication (H) E11.9 metFORMIN (GLUCOPHAGE) 500 MG tablet       Recheck in 6 mos     Discussed smoking cessation.  I told him this increases his risk of complications but he does not want to stop.      Lung cancer screening done       Lung Cancer Screening Shared Decision Making Visit     Tyson Reese is eligible for lung cancer screening on the basis of the information provided in my signed lung cancer screening order.     I have discussed with patient the risks and benefits of screening for lung cancer with low-dose CT.     The risks include:   radiation exposure    false positives     over-diagnosis    The benefit of early detection of lung cancer is contingent upon adherence to annual screening or more frequent follow up if indicated.     Furthermore, reaping the benefits of screening requires Tyson Reese to be  willing and physically able to undergo diagnostic procedures, if indicated. Although no specific guide is available for determining severity of comorbidities, it is reasonable to withhold screening in patients who have greater mortality risk from other diseases.     We did discuss that the only way to prevent lung cancer is to not smoke. Smoking cessation assistance was offered.    I did offer risk estimation using a calculator such as this one:    ShouldIScreen

## 2018-05-14 NOTE — MR AVS SNAPSHOT
After Visit Summary   5/14/2018    Tyson Reese    MRN: [id number]           Patient Information     Date Of Birth          1950        Visit Information        Provider Department      5/14/2018 1:20 PM Edd Carvalho MD [facility]        Today's Diagnoses     Type 2 diabetes mellitus without complication, without long-term current use of insulin (H)    -  1    Hyperlipidemia LDL goal <100        Tobacco use disorder        Other age-related cataract of both eyes        Diabetes mellitus without complication (H)        History of tobacco use          Care Instructions      Lung Cancer Screening   Frequently Asked Questions  If you are at high-risk for lung cancer, getting screened with low-dose computed tomography (LDCT) every year can help save your life. This handout offers answers to some of the most common questions about lung cancer screening. If you have other questions, please call [phone] ([phone]).     What is it?  Lung cancer screening uses special X-ray technology to create an image of your lung tissue. The exam is quick and easy and takes less than 10 seconds. We don t give you any medicine or use any needles. You can eat before and after the exam. You don t need to change your clothes as long as the clothing on your chest doesn t contain metal. But, you do need to be able to hold your breath for at least 6 seconds during the exam.    What is the goal of lung cancer screening?  The goal of lung cancer screening is to save lives. Many times, lung cancer is not found until a person starts having physical symptoms. Lung cancer screening can help detect lung cancer in the earliest stages when it may be easier to treat.    Who should be screened for lung cancer?  We suggest lung cancer screening for anyone who is at high-risk for lung cancer. You are in the high-risk group if you:      are between the ages of 55 and 79, and    have smoked at  least 1 pack of cigarettes a day for 30 or more years, and    still smoke or have quit within the past 15 years.    However, if you have a new cough or shortness of breath, you should talk to your doctor before being screened.    Some national lung health advocacy groups also recommend screening for people ages 50 to 79 who have smoked an average of 1 pack of cigarettes a day for 20 years. They must also have at least 1 other risk factor for lung cancer, not including exposure to secondhand smoke. Other risk factors are having had cancer in the past, emphysema, pulmonary fibrosis, COPD, a family history of lung cancer, or exposure to certain materials such as arsenic, asbestos, beryllium, cadmium, chromium, diesel fumes, nickel, radon or silica. Your care team can help you know if you have one of these risk factors.     Why does it matter if I have symptoms?  Certain symptoms can be a sign that you have a condition in your lungs that should be checked and treated by your doctor. These symptoms include fever, chest pain, a new or changing cough, shortness of breath that you have never felt before, coughing up blood or unexplained weight loss. Having any of these symptoms can greatly affect the results of lung cancer screening.       Should all smokers get an LDCT lung cancer screening exam?  It depends. Lung cancer screening is for a very specific group of men and women who have a history of heavy smoking over a long period of time (see  Who should be screened for lung cancer  above).  I am in the high-risk group, but have been diagnosed with cancer in the past. Is LDCT lung cancer screening right for me?  In some cases, you should not have LDCT lung screening, such as when your doctor is already following your cancer with CT scan studies. Your doctor will help you decide if LDCT lung screening is right for you.  Do I need to have a screening exam every year?  Yes. If you are in the high-risk group described earlier,  you should get an LDCT lung cancer screening exam every year until you are 79, or are no longer willing or able to undergo screening and possible procedures to diagnose and treat lung cancer.  How effective is LDCT at preventing death from lung cancer?  Studies have shown that LDCT lung cancer screening can lower the risk of death from lung cancer by 20 percent in people who are at high-risk.  What are the risks?  There are some risks and limitations of LDCT lung cancer screening. We want to make sure you understand the risks and benefits, so please let us know if you have any questions. Your doctor may want to talk with you more about these risks.    Radiation exposure: As with any exam that uses radiation, there is a very small increased risk of cancer. The amount of radiation in LDCT is small--about the same amount a person would get from a mammogram. Your doctor orders the exam when he or she feels the potential benefits outweigh the risks.    False negatives: No test is perfect, including LDCT. It is possible that you may have a medical condition, including lung cancer, that is not found during your exam. This is called a false negative result.    False positives and more testing: LDCT very often finds something in the lung that could be cancer, but in fact is not. This is called a false positive result. False positive tests often cause anxiety. To make sure these findings are not cancer, you may need to have more tests. These tests will be done only if you give us permission. Sometimes patients need a treatment that can have side effects, such as a biopsy. For more information on false positives, see  What can I expect from the results?     Findings not related to lung cancer: Your LDCT exam also takes pictures of areas of your body next to your lungs. In a very small number of cases, the CT scan will show an abnormal finding in one of these areas, such as your kidneys, adrenal glands, liver or thyroid. This  finding may not be serious, but you may need more tests. Your doctor can help you decide what other tests you may need, if any.  What can I expect from the results?  About 1 out of 4 LDCT exams will find something that may need more tests. Most of the time, these findings are lung nodules. Lung nodules are very small collections of tissue in the lung. These nodules are very common, and the vast majority--more than 97 percent--are not cancer (benign). Most are normal lymph nodes or small areas of scarring from past infections.  But, if a small lung nodule is found to be cancer, the cancer can be cured more than 90 percent of the time. To know if the nodule is cancer, we may need to get more images before your next yearly screening exam. If the nodule has suspicious features (for example, it is large, has an odd shape or grows over time), we will refer you to a specialist for further testing.  Will my doctor also get the results?  Yes. Your doctor will get a copy of your results.  Is it okay to keep smoking now that there s a cancer screening exam?  No. Tobacco is one of the strongest cancer-causing agents. It causes not only lung cancer, but other cancers and cardiovascular (heart) diseases as well. The damage caused by smoking builds over time. This means that the longer you smoke, the higher your risk of disease. While it is never too late to quit, the sooner you quit, the better.  Where can I find help to quit smoking?  The best way to prevent lung cancer is to stop smoking. If you have already quit smoking, congratulations and keep it up! For help on quitting smoking, please call [employer] at [phone] ([phone]) or the American Cancer Society at 1-495.908.4543 to find local resources near you.  One-on-one health coaching:  If you d prefer to work individually with a health care provider on tobacco cessation, we offer:      Medication Therapy Management:  Our specially trained pharmacists work closely with you  and your doctor to help you quit smoking.  Call [phone] or [phone] (toll free).     Can Do: Health coaching offered by [facility].  [email or web address]            Follow-ups after your visit        Your next 10 appointments already scheduled     May 16, 2018  2:45 PM CDT   CT LUNG RESEARCH POPE with BECT1   [facility] [place] ([facility])    [id number] [address] [facility]  [place] [address] [phone]           Please bring any scans or X-rays taken at other hospitals, if similar tests were done. Also bring a list of your medicines, including vitamins, minerals and over-the-counter drugs. It is safest to leave personal items at home.  Be sure to tell your doctor:   If you have any allergies.   If there s any chance you are pregnant.   If you are breastfeeding.  You do not need to do anything special to prepare for this exam.  Please wear loose clothing, such as a sweat suit or jogging clothes. Avoid snaps, zippers and other metal. We may ask you to undress and put on a hospital gown.              Future tests that were ordered for you today     Open Future Orders        Priority Expected Expires Ordered    CT Chest Lung Cancer Scrn Low Dose wo Routine  5/14/2019 5/14/2018            Who to contact     If you have questions or need follow up information about today's clinic visit or your schedule please contact [facility] directly at [phone].  Normal or non-critical lab and imaging results will be communicated to you by MyChart, letter or phone within 4 business days after the clinic has received the results. If you do not hear from us within 7 days, please contact the clinic through MyChart or phone. If you have a critical or abnormal lab result, we will notify you by phone as soon as possible.  Submit refill requests through [employer] or call your pharmacy and they will forward the refill request to us. Please allow 3 business days for  "your refill to be completed.          Additional Information About Your Visit        [employer] Information     [employer] lets you send messages to your doctor, view your test results, renew your prescriptions, schedule appointments and more. To sign up, go to [email or web address] . Click on \"Log in\" on the left side of the screen, which will take you to the Welcome page. Then click on \"Sign up Now\" on the right side of the page.     You will be asked to enter the access code listed below, as well as some personal information. Please follow the directions to create your username and password.     Your access code is: [email or web address]  Expires: 2018  2:05 PM     Your access code will  in 90 days. If you need help or a new code, please call your [facility] clinic or [phone].        Care EveryWhere ID     This is your Care EveryWhere ID. This could be used by other organizations to access your [facility] medical records  [id number]        Your Vitals Were     Pulse Temperature Height Pulse Oximetry BMI (Body Mass Index)       79 97  F (36.1  C) (Oral) 5' 5.5\" (1.664 m) 97% 25.56 kg/m2        Blood Pressure from Last 3 Encounters:   18 105/66   17 124/77   17 109/68    Weight from Last 3 Encounters:   18 156 lb (70.8 kg)   17 150 lb (68 kg)   17 150 lb (68 kg)              We Performed the Following     Albumin Random Urine Quantitative with Creat Ratio     ALT     AST     Basic metabolic panel     CK total     Hemoglobin A1c     Lipid panel reflex to direct LDL Fasting     Okay for Smoking Cessation Study (PLUTO) to Contact Patient     Prof fee: Shared Decisionmaking for Lung Cancer Screening          Where to get your medicines      These medications were sent to [facility]/pharmacy #[id number] - [facility] [address] [facility]  [address] [place] [address]     Phone:  [phone]     metFORMIN 500 MG tablet    simvastatin 40 MG tablet          Primary " Care Provider Office Phone # Fax #    Edd Carvalho [phone]       [address] [facility] [address]        Equal Access to Services     FALGUNI ART : Ivett whitten yarao Sokeerthiali, waaxda luqadaha, qaybta kaalmada adeshalinida, bang zuniga laMarlingarcia fournier. So [facility] [phone].    ATENCIÓN: Si habla español, tiene a frias disposición servicios gratuitos de asistencia lingüística. Llame al [phone].    We comply with applicable federal civil rights laws and [place] laws. We do not discriminate on the basis of race, color, national origin, age, disability, sex, sexual orientation, or gender identity.            Thank you!     Thank you for choosing [facility]  for your care. Our goal is always to provide you with excellent care. Hearing back from our patients is one way we can continue to improve our services. Please take a few minutes to complete the written survey that you may receive in the mail after your visit with us. Thank you!             Your Updated Medication List - Protect others around you: Learn how to safely use, store and throw away your medicines at www.disposemymeds.org.          This list is accurate as of 5/14/18  2:05 PM.  Always use your most recent med list.                   Brand Name Dispense Instructions for use Diagnosis    aspirin 81 MG EC tablet      Take 81 mg by mouth daily.        lisinopril-hydrochlorothiazide 20-12.5 MG per tablet    PRINZIDE/ZESTORETIC    90 tablet    TAKE 1 TABLET BY MOUTH DAILY    Diabetes mellitus without complication (H)       metFORMIN 500 MG tablet    GLUCOPHAGE    180 tablet    Take 1 tablet (500 mg) by mouth 2 times daily (with meals)    Diabetes mellitus without complication (H)       simvastatin 40 MG tablet    ZOCOR    90 tablet    Take 1 tablet (40 mg) by mouth At Bedtime    Hyperlipidemia LDL goal <100

## 2018-05-14 NOTE — LETTER
[facility]  [address] [place]  [address]  [phone]        May 16, 2018    Tysonanthony Reese  [address] [facility] [address]        Dear Tyson,    Your hemoglobin A1c shows good control of your diabetes.    Your total cholesterol is normal, your LDL is normal and your HDL is very good.  Your liver tests and muscle enzyme tests are normal   Your microalbumin test is normal.    Recheck in 6 months for an office visit and labs.    Results for orders placed or performed in visit on 05/14/18   Basic metabolic panel   Result Value Ref Range    Sodium 135 133 - 144 mmol/L    Potassium Canceled, Test credited 3.4 - 5.3 mmol/L    Chloride 103 94 - 109 mmol/L    Carbon Dioxide 22 20 - 32 mmol/L    Anion Gap 10 3 - 14 mmol/L    Glucose 98 70 - 99 mg/dL    Urea Nitrogen 17 7 - 30 mg/dL    Creatinine 0.99 0.66 - 1.25 mg/dL    GFR Estimate 75 >60 mL/min/1.7m2    GFR Estimate If Black >90 >60 mL/min/1.7m2    Calcium 9.4 8.5 - 10.1 mg/dL   Hemoglobin A1c   Result Value Ref Range    Hemoglobin A1C 6.2 (H) 0 - 5.6 %   Lipid panel reflex to direct LDL Fasting   Result Value Ref Range    Cholesterol 133 <200 mg/dL    Triglycerides 176 (H) <150 mg/dL    HDL Cholesterol 41 >39 mg/dL    LDL Cholesterol Calculated 57 <100 mg/dL    Non HDL Cholesterol 92 <130 mg/dL   ALT   Result Value Ref Range    ALT 19 0 - 70 U/L   AST   Result Value Ref Range    AST 28 0 - 45 U/L   CK total   Result Value Ref Range    CK Total 68 30 - 300 U/L   Albumin Random Urine Quantitative with Creat Ratio   Result Value Ref Range    Creatinine Urine 113 mg/dL    Albumin Urine mg/L 11 mg/L    Albumin Urine mg/g Cr 10.09 0 - 17 mg/g Cr       If you have any questions please call the clinic at [phone].    Sincerely,    Edd BECKETT

## 2018-05-15 LAB
CREAT UR-MCNC: 113 MG/DL
MICROALBUMIN UR-MCNC: 11 MG/L
MICROALBUMIN/CREAT UR: 10.09 MG/G CR (ref 0–17)

## 2018-05-15 NOTE — PROGRESS NOTES
Your hemoglobin A1c shows good control of your diabetes.    Your total cholesterol is normal, your LDL is normal and your HDL is very good.  Your liver tests and muscle enzyme tests are normal   Your microalbumin test is normal.    Recheck in 6 months for an office visit and labs.

## 2018-08-23 DIAGNOSIS — E11.9 DIABETES MELLITUS WITHOUT COMPLICATION (H): ICD-10-CM

## 2018-08-23 NOTE — TELEPHONE ENCOUNTER
Requested Prescriptions   Pending Prescriptions Disp Refills     metFORMIN (GLUCOPHAGE) 500 MG tablet [Pharmacy Med Name: METFORMIN  MG TABLET] 180 tablet 3    Last Written Prescription Date:  5-14-18  Last Fill Quantity: 180,  # refills: 3   Last office visit: 5/14/2018 with prescribing provider:  5-14-18   Future Office Visit:   Next 5 appointments (look out 90 days)     Nov 12, 2018  1:20 PM CST   SHORT [facility] Edd [facility]valho MD   [facility]    [address] [place] [address] [phone]                  Sig: TAKE 1 TABLET (500 MG) BY MOUTH 2 TIMES DAILY (WITH MEALS)    Biguanide Agents Passed    8/23/2018  9:34 AM       Passed - Blood pressure less than 140/90 in past 6 months    BP Readings from Last 3 Encounters:   05/14/18 105/66   11/13/17 124/77   05/11/17 109/68                Passed - Patient has documented LDL within the past 12 mos.    Recent Labs   Lab Test  05/14/18   1407   LDL  57            Passed - Patient has had a Microalbumin in the past 12 mos.    Recent Labs   Lab Test  05/14/18   1410  12/14/11   MICROALB   --    --   0.2   MICROL  11   < >   --    UMALCR  10.09   < >  6    < > = values in this interval not displayed.            Passed - Patient is age 10 or older       Passed - Patient has documented A1c within the specified period of time.    If HgbA1C is 8 or greater, it needs to be on file within the past 3 months.  If less than 8, must be on file within the past 6 months.     Recent Labs   Lab Test  05/14/18   1407   A1C  6.2*            Passed - Patient's CR is NOT>1.4 OR Patient's EGFR is NOT<45 within past 12 mos.    Recent Labs   Lab Test  05/14/18   1407   GFRESTIMATED  75   GFRESTBLACK  >90       Recent Labs   Lab Test  05/14/18   1407   CR  0.99            Passed - Patient does NOT have a diagnosis of CHF.       Passed - Recent (6 mo) or future (30 days) visit within the  "authorizing provider's specialty    Patient had office visit in the last 6 months or has a visit in the next 30 days with authorizing provider or within the authorizing provider's specialty.  See \"Patient Info\" tab in inbasket, or \"Choose Columns\" in Meds & Orders section of the refill encounter.              "

## 2018-11-12 ENCOUNTER — OFFICE VISIT (OUTPATIENT)
Dept: FAMILY MEDICINE | Facility: CLINIC | Age: 68
End: 2018-11-12
Payer: COMMERCIAL

## 2018-11-12 VITALS
OXYGEN SATURATION: 96 % | BODY MASS INDEX: 25.4 KG/M2 | HEART RATE: 79 BPM | TEMPERATURE: 96.6 F | DIASTOLIC BLOOD PRESSURE: 72 MMHG | SYSTOLIC BLOOD PRESSURE: 121 MMHG | WEIGHT: 155 LBS

## 2018-11-12 DIAGNOSIS — F17.200 TOBACCO USE DISORDER: ICD-10-CM

## 2018-11-12 DIAGNOSIS — E11.9 TYPE 2 DIABETES MELLITUS WITHOUT COMPLICATION, WITHOUT LONG-TERM CURRENT USE OF INSULIN (H): Primary | ICD-10-CM

## 2018-11-12 LAB
ANION GAP SERPL CALCULATED.3IONS-SCNC: 5 MMOL/L (ref 3–14)
BUN SERPL-MCNC: 11 MG/DL (ref 7–30)
CALCIUM SERPL-MCNC: 9.7 MG/DL (ref 8.5–10.1)
CHLORIDE SERPL-SCNC: 100 MMOL/L (ref 94–109)
CO2 SERPL-SCNC: 30 MMOL/L (ref 20–32)
CREAT SERPL-MCNC: 0.85 MG/DL (ref 0.66–1.25)
GFR SERPL CREATININE-BSD FRML MDRD: 88 ML/MIN/1.7M2
GLUCOSE SERPL-MCNC: 110 MG/DL (ref 70–99)
HBA1C MFR BLD: 6.1 % (ref 0–5.6)
POTASSIUM SERPL-SCNC: 4.5 MMOL/L (ref 3.4–5.3)
SODIUM SERPL-SCNC: 135 MMOL/L (ref 133–144)

## 2018-11-12 PROCEDURE — 80048 BASIC METABOLIC PNL TOTAL CA: CPT | Performed by: FAMILY MEDICINE

## 2018-11-12 PROCEDURE — 83036 HEMOGLOBIN GLYCOSYLATED A1C: CPT | Performed by: FAMILY MEDICINE

## 2018-11-12 PROCEDURE — 36415 COLL VENOUS BLD VENIPUNCTURE: CPT | Performed by: FAMILY MEDICINE

## 2018-11-12 PROCEDURE — 99214 OFFICE O/P EST MOD 30 MIN: CPT | Performed by: FAMILY MEDICINE

## 2018-11-12 NOTE — PROGRESS NOTES
SUBJECTIVE:   Tyson Reese is a 68 year old male who presents to clinic today for the following health issues:    Diabetes Follow-up      Patient is checking blood sugars: not at all    Diabetic concerns: None     Symptoms of hypoglycemia (low blood sugar): shaky, dizzy, weak at times     Paresthesias (numbness or burning in feet) or sores: No     Date of last diabetic eye exam: 6/6/17 - [facility]    BP Readings from Last 2 Encounters:   05/14/18 105/66   11/13/17 124/77     Hemoglobin A1C (%)   Date Value   05/14/2018 6.2 (H)   11/13/2017 5.8     LDL Cholesterol Calculated (mg/dL)   Date Value   05/14/2018 57   05/11/2017 67       Diabetes Management Resources    Amount of exercise or physical activity: 6-7 days/week for an average of about an hour    Problems taking medications regularly: No    Medication side effects: none  Diet: regular (no restrictions)    Ros: no chest pain, chest tightness   No sob   No leg edema   No abdominal pain     Denies fever or chills   Weight stable     He smells of cigarettes   He states he smokes 5 ciggs per day   He goes through a pack in 2 days     I discussed this with him     Denies history of aneurysm in the family     O: /72 (BP Location: Left arm, Patient Position: Sitting, Cuff Size: Adult Regular)  Pulse 79  Temp 96.6  F (35.9  C) (Pulmonary Artery)  Wt 155 lb (70.3 kg)  SpO2 96%  BMI 25.4 kg/m2    Head: Normocephalic, atraumatic.  Eyes: Conjunctiva clear, non icteric. PERRLA.  Ears: External ears and TMs normal BL.  Nose: Septum midline, nasal mucosa pink and moist. No discharge.  Mouth / Throat: Normal dentition.  No oral lesions. Pharynx non erythematous, tonsils without hypertrophy.  Neck: Supple, no enlarged LN, trachea midline.    Chest wall normal to inspection and palpation. Good excursion bilaterally. Lungs clear to auscultation. Good air movement bilaterally without rales, wheezes, or rhonchi.   Regular rate and  rhythm. S1 and S2 normal, no murmurs,  clicks, gallops or rubs. No edema or JVD.     normal DP and PT pulses, no trophic changes or ulcerative lesions, normal sensory exam and normal monofilament exam     Problem list and histories reviewed & adjusted, as indicated.  Additional history: as documented    Current Outpatient Prescriptions   Medication Sig Dispense Refill     aspirin 81 MG EC tablet Take 81 mg by mouth daily.       lisinopril-hydrochlorothiazide (PRINZIDE/ZESTORETIC) 20-12.5 MG per tablet TAKE 1 TABLET BY MOUTH DAILY 90 tablet 3     metFORMIN (GLUCOPHAGE) 500 MG tablet Take 1 tablet (500 mg) by mouth 2 times daily (with meals) 180 tablet 3     simvastatin (ZOCOR) 40 MG tablet Take 1 tablet (40 mg) by mouth At Bedtime 90 tablet 3     No Known Allergies  Recent Labs   Lab Test  05/14/18   1407  11/13/17   1413  05/11/17   1355   05/09/16   1114   11/07/14   1243   A1C  6.2*  5.8  6.1*   < >  6.1*   < >  6.0   LDL  57   --   67   --   47   < >  64   HDL  41   --   40   --   38*   < >  42   TRIG  176*   --   170*   --   146   < >  165*   ALT  19   --   20   --   15   < >  20   CR  0.99  1.00  0.88   < >  0.81   < >  0.93   GFRESTIMATED  75  74  85   < >  >90  Non  GFR Calc     < >  81   GFRESTBLACK  >90  90  >90  African American GFR Calc     < >  >90   GFR Calc     < >  >90   GFR Calc     POTASSIUM  Canceled, Test credited  4.2  3.9   < >  4.2   < >  4.2   TSH   --   1.00   --    --    --    --   1.26    < > = values in this interval not displayed.      BP Readings from Last 3 Encounters:   11/12/18 121/72   05/14/18 105/66   11/13/17 124/77    Wt Readings from Last 3 Encounters:   11/12/18 155 lb (70.3 kg)   05/14/18 156 lb (70.8 kg)   11/13/17 150 lb (68 kg)                    ICD-10-CM    1. Type 2 diabetes mellitus without complication, without long-term current use of insulin (H) E11.9 OPTOMETRY REFERRAL     Basic metabolic panel     Hemoglobin A1c     Patient has well controlled diabetes    He is due for an eye exam   Discussed smoking cessation     Recheck in 6 months   No change in meds   All meds are renewed until 5/2018         Reviewed and updated as needed this visit by clinical staff       Reviewed and updated as needed this visit by Provider

## 2018-11-12 NOTE — MR AVS SNAPSHOT
After Visit Summary   11/12/2018    Tyson Reese    MRN: [id number]           Patient Information     Date Of Birth          1950        Visit Information        Provider Department      11/12/2018 1:20 PM Edd Carvalho MD [facility]        Today's Diagnoses     Type 2 diabetes mellitus without complication, without long-term current use of insulin (H)    -  1    Tobacco use disorder           Follow-ups after your visit        Additional Services     OPTOMETRY REFERRAL       Your provider has referred you to: G: [facility] [phone]    [email or web address]    Please be aware that coverage of these services is subject to the terms and limitations of your health insurance plan.  Call member services at your health plan with any benefit or coverage questions.      Please bring the following with you to your appointment:    (1) Any X-Rays, CTs or MRIs which have been performed.  Contact the facility where they were done to arrange for  prior to your scheduled appointment.    (2) List of current medications  (3) This referral request   (4) Any documents/labs given to you for this referral                  Follow-up notes from your care team     Return in about 6 months (around 5/12/2019) for diabetes.      Your next 10 appointments already scheduled     Dec 06, 2018  9:30 AM CST   New Visit with Luiz Santamaria MD   [facility]    [address] [place] [id number]   [phone]            Christiano 10, 2019  1:20 PM CDT   SHORT with Edd Carvalho MD   [facility] ([facility])    [address] [place] [id number] [phone]              Who to contact     If you have questions or need follow up information about today's clinic visit or your schedule please contact [facility]  "[facility] directly at [phone].  Normal or non-critical lab and imaging results will be communicated to you by MyChart, letter or phone within 4 business days after the clinic has received the results. If you do not hear from us within 7 days, please contact the clinic through [employer] or phone. If you have a critical or abnormal lab result, we will notify you by phone as soon as possible.  Submit refill requests through [employer] or call your pharmacy and they will forward the refill request to us. Please allow 3 business days for your refill to be completed.          Additional Information About Your Visit        [employer] Information     [employer] lets you send messages to your doctor, view your test results, renew your prescriptions, schedule appointments and more. To sign up, go to [email or web address] . Click on \"Log in\" on the left side of the screen, which will take you to the Welcome page. Then click on \"Sign up Now\" on the right side of the page.     You will be asked to enter the access code listed below, as well as some personal information. Please follow the directions to create your username and password.     Your access code is: [email or web address]  Expires: 2/10/2019  1:43 PM     Your access code will  in 90 days. If you need help or a new code, please call your [facility] clinic or [phone].        Care EveryWhere ID     This is your Care EveryWhere ID. This could be used by other organizations to access your [facility] medical records  [id number]        Your Vitals Were     Pulse Temperature Pulse Oximetry BMI (Body Mass Index)          79 96.6  F (35.9  C) (Pulmonary Artery) 96% 25.4 kg/m2         Blood Pressure from Last 3 Encounters:   18 121/72   18 105/66   17 124/77    Weight from Last 3 Encounters:   18 155 lb (70.3 kg)   18 156 lb (70.8 kg)   17 150 lb (68 kg)              We Performed the Following     Basic metabolic panel     Hemoglobin A1c     " OPTOMETRY REFERRAL        Primary Care Provider Office Phone # Fax #    Edd Carvalho [phone]       [address], [place]. \Hospital Has a New Name and Outlook.\"" [id number]        Equal Access to Services     FALGUNI ART : Hadii aad ku hadidaliao Sokeerthiali, waaxda luqadaha, qaybta kaalmada adeegyada, bang mohancrystal raimundo zuniga beulah fournier. So [facility] [phone].    ATENCIÓN: Si habla español, tiene a frias disposición servicios gratuitos de asistencia lingüística. Llame al [phone].    We comply with applicable federal civil rights laws and [place] laws. We do not discriminate on the basis of race, color, national origin, age, disability, sex, sexual orientation, or gender identity.            Thank you!     Thank you for choosing [facility]  for your care. Our goal is always to provide you with excellent care. Hearing back from our patients is one way we can continue to improve our services. Please take a few minutes to complete the written survey that you may receive in the mail after your visit with us. Thank you!             Your Updated Medication List - Protect others around you: Learn how to safely use, store and throw away your medicines at www.disposemymeds.org.          This list is accurate as of 11/12/18  2:01 PM.  Always use your most recent med list.                   Brand Name Dispense Instructions for use Diagnosis    aspirin 81 MG EC tablet      Take 81 mg by mouth daily.        lisinopril-hydrochlorothiazide 20-12.5 MG per tablet    PRINZIDE/ZESTORETIC    90 tablet    TAKE 1 TABLET BY MOUTH DAILY    Diabetes mellitus without complication (H)       metFORMIN 500 MG tablet    GLUCOPHAGE    180 tablet    Take 1 tablet (500 mg) by mouth 2 times daily (with meals)    Diabetes mellitus without complication (H)       simvastatin 40 MG tablet    ZOCOR    90 tablet    Take 1 tablet (40 mg) by mouth At Bedtime    Hyperlipidemia LDL goal <100

## 2018-11-12 NOTE — LETTER
[facility]   [address] [place]  [address]  [phone]                                   November 13, 2018    Tyson Reese  [address] [facility] [address]        Dear Tyson,    Your diabetes control is good     Your blood sugar is mildly elevated.  Your electrolytes and kidney function tests were normal.  Recheck in 6 months.    Results for orders placed or performed in visit on 11/12/18   Basic metabolic panel   Result Value Ref Range    Sodium 135 133 - 144 mmol/L    Potassium 4.5 3.4 - 5.3 mmol/L    Chloride 100 94 - 109 mmol/L    Carbon Dioxide 30 20 - 32 mmol/L    Anion Gap 5 3 - 14 mmol/L    Glucose 110 (H) 70 - 99 mg/dL    Urea Nitrogen 11 7 - 30 mg/dL    Creatinine 0.85 0.66 - 1.25 mg/dL    GFR Estimate 88 >60 mL/min/1.7m2    GFR Estimate If Black >90 >60 mL/min/1.7m2    Calcium 9.7 8.5 - 10.1 mg/dL   Hemoglobin A1c   Result Value Ref Range    Hemoglobin A1C 6.1 (H) 0 - 5.6 %       If you have any questions please call the clinic at [phone]    Sincerely,    Edd Carvalho MD  bmd

## 2018-11-13 NOTE — PROGRESS NOTES
Your diabetes control is good     Your blood sugar is mildly elevated.  Your electrolytes and kidney function tests were normal.  Recheck in 6 months.

## 2018-12-06 ENCOUNTER — OFFICE VISIT (OUTPATIENT)
Dept: OPHTHALMOLOGY | Facility: CLINIC | Age: 68
End: 2018-12-06
Payer: COMMERCIAL

## 2018-12-06 DIAGNOSIS — Z01.01 ENCOUNTER FOR EXAMINATION OF EYES AND VISION WITH ABNORMAL FINDINGS: ICD-10-CM

## 2018-12-06 DIAGNOSIS — H25.13 NUCLEAR SCLEROTIC CATARACT OF BOTH EYES: ICD-10-CM

## 2018-12-06 DIAGNOSIS — E11.9 TYPE 2 DIABETES MELLITUS WITHOUT RETINOPATHY (H): Primary | ICD-10-CM

## 2018-12-06 DIAGNOSIS — H52.4 PRESBYOPIA: ICD-10-CM

## 2018-12-06 DIAGNOSIS — D31.31 CHOROIDAL NEVUS OF RIGHT EYE: ICD-10-CM

## 2018-12-06 PROCEDURE — 92015 DETERMINE REFRACTIVE STATE: CPT | Performed by: STUDENT IN AN ORGANIZED HEALTH CARE EDUCATION/TRAINING PROGRAM

## 2018-12-06 PROCEDURE — 92004 COMPRE OPH EXAM NEW PT 1/>: CPT | Performed by: STUDENT IN AN ORGANIZED HEALTH CARE EDUCATION/TRAINING PROGRAM

## 2018-12-06 ASSESSMENT — TONOMETRY
OD_IOP_MMHG: 18
OS_IOP_MMHG: 20
IOP_METHOD: APPLANATION

## 2018-12-06 ASSESSMENT — REFRACTION_WEARINGRX
OS_SPHERE: DIDNT BRING
OD_SPHERE: DIDNT BRING

## 2018-12-06 ASSESSMENT — REFRACTION_MANIFEST
OS_ADD: +3.00
OD_SPHERE: -2.00
OS_AXIS: 150
OS_SPHERE: -1.75
OD_ADD: +3.00
OS_CYLINDER: +0.75
OD_CYLINDER: SPHERE

## 2018-12-06 ASSESSMENT — CONF VISUAL FIELD
OD_NORMAL: 1
OS_NORMAL: 1

## 2018-12-06 ASSESSMENT — VISUAL ACUITY
OS_SC: J5
OD_SC: J3
OD_SC: 20/60
METHOD: SNELLEN - LINEAR
OS_SC: 20/40
OD_SC+: +1

## 2018-12-06 ASSESSMENT — SLIT LAMP EXAM - LIDS: COMMENTS: 2+ DERMATOCHALASIS - UPPER LID

## 2018-12-06 ASSESSMENT — EXTERNAL EXAM - LEFT EYE: OS_EXAM: NORMAL

## 2018-12-06 ASSESSMENT — CUP TO DISC RATIO
OD_RATIO: 0.1
OS_RATIO: 0.1

## 2018-12-06 ASSESSMENT — EXTERNAL EXAM - RIGHT EYE: OD_EXAM: NORMAL

## 2018-12-06 NOTE — LETTER
"    12/6/2018         RE: Tyson Reese  [address] [address]        Dear Colleague,    Thank you for referring your patient, Tyson Reese, to the [facility].     No signs of diabetic retinopathy in either eye today.  Please see a copy of my visit note below.     Current Eye Medications:  None     Subjective:  Complete eye exam    Diabetic Exam.   Both eyes water a lot.  No visual complaints.  Left glasses at home.    Lab Results   Component Value Date    A1C 6.1 11/12/2018    A1C 6.2 05/14/2018    A1C 5.8 11/13/2017    A1C 6.1 05/11/2017    A1C 6.1 11/10/2016      Objective:  See Ophthalmology Exam.       Assessment:  Tyson Reese is a 68 year old male who presents with:     Type 2 diabetes mellitus without retinopathy (H) Negative diabetic retinopathy      Nuclear sclerotic cataract of both eyes      Choroidal nevus of right eye        Plan:  Glasses prescription given   Keep blood sugars and blood pressure under good control.  Use artificial tears up to 4 times per day (Refresh Plus, Systane Balance, or generic artificial tears are ok. Avoid \"get the red out\" drops).     Luiz Santamaria MD  [phone]          Again, thank you for allowing me to participate in the care of your patient.        Sincerely,        Luiz Santamaria MD    "

## 2018-12-06 NOTE — PROGRESS NOTES
" Current Eye Medications:  None     Subjective:  Complete eye exam    Diabetic Exam.   Both eyes water a lot.  No visual complaints.  Left glasses at home.    Lab Results   Component Value Date    A1C 6.1 11/12/2018    A1C 6.2 05/14/2018    A1C 5.8 11/13/2017    A1C 6.1 05/11/2017    A1C 6.1 11/10/2016      Objective:  See Ophthalmology Exam.       Assessment:  Tyson Reese is a 68 year old male who presents with:     Type 2 diabetes mellitus without retinopathy (H) Negative diabetic retinopathy      Nuclear sclerotic cataract of both eyes      Choroidal nevus of right eye        Plan:  Glasses prescription given   Keep blood sugars and blood pressure under good control.  Use artificial tears up to 4 times per day (Refresh Plus, Systane Balance, or generic artificial tears are ok. Avoid \"get the red out\" drops).     Luiz Santamaria MD  [phone]        "

## 2018-12-06 NOTE — PATIENT INSTRUCTIONS
"Glasses prescription given   Keep blood sugars and blood pressure under good control.  Use artificial tears up to 4 times per day (Refresh Plus, Systane Balance, or generic artificial tears are ok. Avoid \"get the red out\" drops).     Luiz Santamaria MD  [phone]    Diabetes weakens the blood vessels all over the body, including the eyes. Damage to the blood vessels in the eyes can cause swelling or bleeding into part of the eye (called the retina). This is called diabetic retinopathy ([facility]-the). If not treated, this disease can cause vision loss or blindness.   Symptoms may include blurred or distorted vision, but many people have no symptoms. It's important to see your eye doctor regularly to check for problems.   Early treatment and good control can help protect your vision. Here are the things you can do to help prevent vision loss:      1. Keep your blood sugar levels under tight control.      2. Bring high blood pressure under control.      3. No smoking.      4. Have yearly dilated eye exams.    "

## 2018-12-24 NOTE — MR AVS SNAPSHOT
"              After Visit Summary   12/6/2018    Tyson Reese    MRN: [id number]           Patient Information     Date Of Birth          1950        Visit Information        Provider Department      12/6/2018 9:30 AM Luiz Santamaria MD [facility]        Today's Diagnoses     Encounter for examination of eyes and vision with abnormal findings    -  1    Presbyopia        Nuclear sclerotic cataract of both eyes        Choroidal nevus of right eye          Care Instructions    Glasses prescription given   Keep blood sugars and blood pressure under good control.  Use artificial tears up to 4 times per day (Refresh Plus, Systane Balance, or generic artificial tears are ok. Avoid \"get the red out\" drops).     Luiz Santamaria MD  [phone]    Diabetes weakens the blood vessels all over the body, including the eyes. Damage to the blood vessels in the eyes can cause swelling or bleeding into part of the eye (called the retina). This is called diabetic retinopathy (BRIAN-tin--pu-thee). If not treated, this disease can cause vision loss or blindness.   Symptoms may include blurred or distorted vision, but many people have no symptoms. It's important to see your eye doctor regularly to check for problems.   Early treatment and good control can help protect your vision. Here are the things you can do to help prevent vision loss:      1. Keep your blood sugar levels under tight control.      2. Bring high blood pressure under control.      3. No smoking.      4. Have yearly dilated eye exams.            Follow-ups after your visit        Follow-up notes from your care team     Return in about 1 year (around 12/6/2019) for Complete Exam.      Your next 10 appointments already scheduled     Christiano 10, 2019  1:20 PM CDT   [facility]    [address] [facility] [address] " Assessment/Plan:       Problem List Items Addressed This Visit        Respiratory    Obstructive sleep apnea syndrome       Cardiovascular and Mediastinum    ASCVD (arteriosclerotic cardiovascular disease)     She is stable  Continued exercise routinely  Relevant Medications    nitroglycerin (NITROSTAT) 0 4 mg SL tablet    Essential hypertension    Relevant Orders    Comprehensive metabolic panel    CBC and differential    Hypertensive heart disease without congestive heart failure    Relevant Medications    nitroglycerin (NITROSTAT) 0 4 mg SL tablet    Other Relevant Orders    Comprehensive metabolic panel       Other    Hypercholesterolemia    Relevant Orders    Lipid Panel with Direct LDL reflex    Hyperglycemia    Relevant Orders    Hemoglobin A1C    Class 3 severe obesity due to excess calories with serious comorbidity in [facility])    Relevant Orders    Comprehensive metabolic panel    Hemoglobin A1C      Other Visit Diagnoses     Need for hepatitis C screening test    -  Primary            Subjective:      Patient ID: Marlene Rodriguez is a 56 y o  female  HPI The patient presents today for a hypertension follow-up  Patient remains compliant with medications and denies any chest pain, shortness of breath, palpitations, lightheadedness or diaphoresis  She has a history of chest pain  She does follow with Cardiology  She has stent in 2007  She remains on aspirin  Fortunately, she has had no chest pain  She is active and has no exertional chest pain, shortness of breath or palpitations  She has history of hyperlipidemia tolerate statin therapy without myalgias  She has history of sleep apnea and remains on CPAP therapy  She has history of elevated glucose and a strong family history of diabetes  Fortunately, A1c was normal   She has a trigger finger of her left middle finger and would like an injection today as the last 1 lasted about a year    This has been getting worse for about 2 months "  [phone]              Who to contact     If you have questions or need follow up information about today's clinic visit or your schedule please contact [facility] directly at [phone].  Normal or non-critical lab and imaging results will be communicated to you by MyChart, letter or phone within 4 business days after the clinic has received the results. If you do not hear from us within 7 days, please contact the clinic through MyChart or phone. If you have a critical or abnormal lab result, we will notify you by phone as soon as possible.  Submit refill requests through [employer] or call your pharmacy and they will forward the refill request to us. Please allow 3 business days for your refill to be completed.          Additional Information About Your Visit        [facility] Information     [employer] lets you send messages to your doctor, view your test results, renew your prescriptions, schedule appointments and more. To sign up, go to [email or web address] . Click on \"Log in\" on the left side of the screen, which will take you to the Welcome page. Then click on \"Sign up Now\" on the right side of the page.     You will be asked to enter the access code listed below, as well as some personal information. Please follow the directions to create your username and password.     Your access code is: [email or web address]  Expires: 2/10/2019  1:43 PM     Your access code will  in 90 days. If you need help or a new code, please call your [facility] clinic or [phone].        Care EveryWhere ID     This is your Care EveryWhere ID. This could be used by other organizations to access your [facility] medical records  [id number]         Blood Pressure from Last 3 Encounters:   18 121/72   18 105/66   17 124/77    Weight from Last 3 Encounters:   18 70.3 kg (155 lb)   18 70.8 kg (156 lb)   17 68 kg (150 lb)              We Performed the Following     EYE EXAM " now     The following portions of the patient's history were reviewed and updated as appropriate: allergies, current medications, past family history, past medical history, past social history, past surgical history and problem list     Review of Systems   Constitutional: Negative for appetite change, chills, fatigue, fever and unexpected weight change  HENT: Negative for trouble swallowing  Eyes: Negative for visual disturbance  Respiratory: Negative for cough, chest tightness, shortness of breath and wheezing  Cardiovascular: Negative for chest pain  Gastrointestinal: Negative for abdominal distention, abdominal pain, blood in stool, constipation and diarrhea  Endocrine: Negative for polyuria  Genitourinary: Negative for difficulty urinating and flank pain  Musculoskeletal: Negative for arthralgias and myalgias  Skin: Negative for rash  Neurological: Negative for dizziness and light-headedness  Hematological: Negative for adenopathy  Does not bruise/bleed easily  Psychiatric/Behavioral: Negative for sleep disturbance  Objective:      /80   Pulse 68   Resp 18   Ht 5' 2" (1 575 m)   Wt 99 3 kg (219 lb)   SpO2 97%   BMI 40 06 kg/m²          Physical Exam   Constitutional: She is oriented to person, place, and time  She appears well-developed and well-nourished  No distress  HENT:   Head: Normocephalic  Eyes: Pupils are equal, round, and reactive to light  Neck: No tracheal deviation present  No thyromegaly present  Cardiovascular: Normal rate, regular rhythm and normal heart sounds  No murmur heard  Pulmonary/Chest: Effort normal  No respiratory distress  She has no wheezes  She has no rales  Abdominal: Soft  She exhibits no distension  There is no tenderness  Musculoskeletal: Normal range of motion  Neurological: She is alert and oriented to person, place, and time  No cranial nerve deficit  Skin: Skin is warm  She is not diaphoretic     Psychiatric: (SIMPLE-NONBILLABLE)     REFRACTIVE STATUS        Primary Care Provider Office Phone # Fax #    Edd Carvalho [phone]       [address] [place] [address]        Equal Access to Services     FALGUNI ART : Hadgem kirk whitten yarao Soomaali, waaxda luqadaha, qaybta kaalmada adeegyada, bang alexiain hayaan erlinlatasha zuniga beulah fournier. So [facility] [phone].    ATENCIÓN: Si habla español, tiene a frias disposición servicios gratuitos de asistencia lingüística. Llame al [phone].    We comply with applicable federal civil rights laws and [place] laws. We do not discriminate on the basis of race, color, national origin, age, disability, sex, sexual orientation, or gender identity.            Thank you!     Thank you [facility]  for your care. Our goal is always to provide you with excellent care. Hearing back from our patients is one way we can continue to improve our services. Please take a few minutes to complete the written survey that you may receive in the mail after your visit with us. Thank you!             Your Updated Medication List - Protect others around you: Learn how to safely use, store and throw away your medicines at www.disposemymeds.org.          This list is accurate as of 12/6/18 10:26 AM.  Always use your most recent med list.                   Brand Name Dispense Instructions for use Diagnosis    aspirin 81 MG EC tablet    ASA     Take 81 mg by mouth daily.        lisinopril-hydrochlorothiazide 20-12.5 MG tablet    PRINZIDE/ZESTORETIC    90 tablet    TAKE 1 TABLET BY MOUTH DAILY    Diabetes mellitus without complication (H)       metFORMIN 500 MG tablet    GLUCOPHAGE    180 tablet    Take 1 tablet (500 mg) by mouth 2 times daily (with meals)    Diabetes mellitus without complication (H)       simvastatin 40 MG tablet    ZOCOR    90 tablet    Take 1 tablet (40 mg) by mouth At Bedtime    Hyperlipidemia LDL goal <100          She has a normal mood and affect  Judgment and thought content normal        Hand/upper extremity injection  Date/Time: 12/24/2018 8:59 AM  Consent given by: patient  Supporting Documentation  Indications: tendon swelling   Procedure Details  Condition:trigger finger Location: long finger - L long A1   Preparation: Patient was prepped and draped in the usual sterile fashion  Needle size: 22 G  Medications administered: 0 5 mL lidocaine 0 5 %; 20 mg triamcinolone acetonide 40 mg/mL  Patient tolerance: patient tolerated the procedure well with no immediate complications  Dressing:  Sterile dressing applied       Cassie Trejo MD    BMI Counseling: Body mass index is 40 06 kg/m²  Discussed the patient's BMI with her  The BMI is above average  BMI counseling and education was provided to the patient  Nutrition recommendations include reducing portion sizes, decreasing overall calorie intake and 3-5 servings of fruits/vegetables daily

## 2019-05-26 DIAGNOSIS — E11.9 DIABETES MELLITUS WITHOUT COMPLICATION (H): ICD-10-CM

## 2019-05-28 NOTE — TELEPHONE ENCOUNTER
"Requested Prescriptions   Pending Prescriptions Disp Refills     lisinopril-hydrochlorothiazide (PRINZIDE/ZESTORETIC) 20-12.5 MG tablet [Pharmacy Med Name: LISINOPRIL-HCTZ 20-12.5 MG TAB] 90 tablet 3     Sig: TAKE 1 TABLET BY MOUTH DAILY   Last Written Prescription Date:  5/4/18  Last Fill Quantity: 90,  # refills: 3   Last office visit: 11/12/2018 with prescribing provider:     Future Office Visit:   Next 5 appointments (look out 90 days)    Christiano 10, 2019  1:20 PM CDT  SHORT with [facility] [address] [place] [id number] [phone]             Diuretics (Including Combos) Protocol Passed - 5/26/2019  8:33 AM        Passed - Blood pressure under 140/90 in past 12 months     BP Readings from Last 3 Encounters:   11/12/18 121/72   05/14/18 105/66   11/13/17 124/77                 Passed - Recent (12 mo) or future (30 days) visit within the authorizing provider's specialty     Patient had office visit in the last 12 months or has a visit in the next 30 days with authorizing provider or within the authorizing provider's specialty.  See \"Patient Info\" tab in inbasket, or \"Choose Columns\" in Meds & Orders section of the refill encounter.              Passed - Medication is active on med list        Passed - Patient is age 18 or older        Passed - Normal serum creatinine on file in past 12 months     Recent Labs   Lab Test 11/12/18  1356   CR 0.85              Passed - Normal serum potassium on file in past 12 months     Recent Labs   Lab Test 11/12/18  1356   POTASSIUM 4.5                    Passed - Normal serum sodium on file in past 12 months     Recent Labs   Lab Test 11/12/18  1356                   "

## 2019-05-29 RX ORDER — LISINOPRIL AND HYDROCHLOROTHIAZIDE 12.5; 2 MG/1; MG/1
TABLET ORAL
Qty: 90 TABLET | Refills: 3 | Status: SHIPPED | OUTPATIENT
Start: 2019-05-29

## 2019-05-29 NOTE — TELEPHONE ENCOUNTER
Prescription approved per [facility].    Santi Michael RN

## 2019-06-05 DIAGNOSIS — E78.5 HYPERLIPIDEMIA LDL GOAL <100: ICD-10-CM

## 2019-06-05 NOTE — TELEPHONE ENCOUNTER
"Requested Prescriptions   Pending Prescriptions Disp Refills     simvastatin (ZOCOR) 40 MG tablet [Pharmacy Med Name: SIMVASTATIN 40 MG TABLET] 90 tablet 3     Sig: TAKE 1 TABLET (40 MG) BY MOUTH AT BEDTIME   Last Written Prescription Date:  5-14-18  Last Fill Quantity: 90,  # refills: 3   Last office visit: 11/12/2018 with prescribing provider:  11-12-18   Future Office Visit:   Next 5 appointments (look out 90 days)    Christiano 10, 2019  1:20 PM CDT  SHORT with Edd Carvalho MD  [facility] [address] [place] [id number]  [phone]             Statins Protocol Failed - 6/5/2019  9:14 AM        Failed - LDL on file in past 12 months     Recent Labs   Lab Test 05/14/18  1407   LDL 57             Passed - No abnormal creatine kinase in past 12 months     Recent Labs   Lab Test 05/14/18  1407   CKT 68                Passed - Recent (12 mo) or future (30 days) visit within the authorizing provider's specialty     Patient had office visit in the last 12 months or has a visit in the next 30 days with authorizing provider or within the authorizing provider's specialty.  See \"Patient Info\" tab in inbasket, or \"Choose Columns\" in Meds & Orders section of the refill encounter.              Passed - Medication is active on med list        Passed - Patient is age 18 or older          "

## 2019-06-06 RX ORDER — SIMVASTATIN 40 MG
40 TABLET ORAL AT BEDTIME
Qty: 30 TABLET | Refills: 0 | Status: SHIPPED | OUTPATIENT
Start: 2019-06-06 | End: 2019-06-10

## 2019-06-06 NOTE — TELEPHONE ENCOUNTER
6 mos follow up was advised in November, patient overdue for visit but is scheduled.    Medication is being filled for 1 time refill only due to:  Patient needs to be seen because due for fasting labs and visit.     Phyllis Queen RN  [facility]

## 2019-06-10 ENCOUNTER — OFFICE VISIT (OUTPATIENT)
Dept: FAMILY MEDICINE | Facility: CLINIC | Age: 69
End: 2019-06-10
Payer: COMMERCIAL

## 2019-06-10 VITALS
WEIGHT: 148 LBS | HEART RATE: 88 BPM | BODY MASS INDEX: 24.66 KG/M2 | HEIGHT: 65 IN | TEMPERATURE: 96.8 F | DIASTOLIC BLOOD PRESSURE: 70 MMHG | SYSTOLIC BLOOD PRESSURE: 116 MMHG | OXYGEN SATURATION: 94 %

## 2019-06-10 DIAGNOSIS — E78.5 HYPERLIPIDEMIA LDL GOAL <100: ICD-10-CM

## 2019-06-10 DIAGNOSIS — Z13.220 SCREENING FOR HYPERLIPIDEMIA: ICD-10-CM

## 2019-06-10 DIAGNOSIS — E11.9 DIABETES MELLITUS WITHOUT COMPLICATION (H): Primary | ICD-10-CM

## 2019-06-10 DIAGNOSIS — F17.200 SMOKING: ICD-10-CM

## 2019-06-10 LAB
ANION GAP SERPL CALCULATED.3IONS-SCNC: 5 MMOL/L (ref 3–14)
BUN SERPL-MCNC: 11 MG/DL (ref 7–30)
CALCIUM SERPL-MCNC: 9.4 MG/DL (ref 8.5–10.1)
CHLORIDE SERPL-SCNC: 98 MMOL/L (ref 94–109)
CHOLEST SERPL-MCNC: 140 MG/DL
CO2 SERPL-SCNC: 28 MMOL/L (ref 20–32)
CREAT SERPL-MCNC: 0.74 MG/DL (ref 0.66–1.25)
GFR SERPL CREATININE-BSD FRML MDRD: >90 ML/MIN/{1.73_M2}
GLUCOSE SERPL-MCNC: 93 MG/DL (ref 70–99)
HBA1C MFR BLD: 6.1 % (ref 0–5.6)
HDLC SERPL-MCNC: 46 MG/DL
LDLC SERPL CALC-MCNC: 53 MG/DL
NONHDLC SERPL-MCNC: 94 MG/DL
POTASSIUM SERPL-SCNC: 4.9 MMOL/L (ref 3.4–5.3)
SODIUM SERPL-SCNC: 131 MMOL/L (ref 133–144)
TRIGL SERPL-MCNC: 205 MG/DL

## 2019-06-10 PROCEDURE — 83036 HEMOGLOBIN GLYCOSYLATED A1C: CPT | Performed by: FAMILY MEDICINE

## 2019-06-10 PROCEDURE — 80048 BASIC METABOLIC PNL TOTAL CA: CPT | Performed by: FAMILY MEDICINE

## 2019-06-10 PROCEDURE — 99214 OFFICE O/P EST MOD 30 MIN: CPT | Performed by: FAMILY MEDICINE

## 2019-06-10 PROCEDURE — 80061 LIPID PANEL: CPT | Performed by: FAMILY MEDICINE

## 2019-06-10 PROCEDURE — 36415 COLL VENOUS BLD VENIPUNCTURE: CPT | Performed by: FAMILY MEDICINE

## 2019-06-10 RX ORDER — SIMVASTATIN 40 MG
40 TABLET ORAL AT BEDTIME
Qty: 90 TABLET | Refills: 3 | Status: SHIPPED | OUTPATIENT
Start: 2019-06-10 | End: 2019-12-10

## 2019-06-10 ASSESSMENT — MIFFLIN-ST. JEOR: SCORE: 1357.16

## 2019-06-10 NOTE — LETTER
[facility]   [address] [place]  [address]  [phone]                               June 11, 2019    Tyson Reese  [address] [facility] [address]        Dear Tyson,    Hemoglobin A1c shows good control of your diabetes.  Your cholesterols are normal except for mild elevation of your triglycerides.  Your kidney function tests normal, slightly low sodium and this is probably related to your blood pressure pill.  Perhaps ease up on your sodium restriction if you are doing that.  Follow-up in 6 months as previously recommended.    Results for orders placed or performed in visit on 06/10/19   HEMOGLOBIN A1C   Result Value Ref Range    Hemoglobin A1C 6.1 (H) 0 - 5.6 %   BASIC METABOLIC PANEL   Result Value Ref Range    Sodium 131 (L) 133 - 144 mmol/L    Potassium 4.9 3.4 - 5.3 mmol/L    Chloride 98 94 - 109 mmol/L    Carbon Dioxide 28 20 - 32 mmol/L    Anion Gap 5 3 - 14 mmol/L    Glucose 93 70 - 99 mg/dL    Urea Nitrogen 11 7 - 30 mg/dL    Creatinine 0.74 0.66 - 1.25 mg/dL    GFR Estimate >90 >60 mL/min/[1.73_m2]    GFR Estimate If Black >90 >60 mL/min/[1.73_m2]    Calcium 9.4 8.5 - 10.1 mg/dL   Lipid panel reflex to direct LDL Fasting   Result Value Ref Range    Cholesterol 140 <200 mg/dL    Triglycerides 205 (H) <150 mg/dL    HDL Cholesterol 46 >39 mg/dL    LDL Cholesterol Calculated 53 <100 mg/dL    Non HDL Cholesterol 94 <130 mg/dL   If you have any questions please call the clinic at [phone]    Sincerely,    Edd Carvalho MD  bmd

## 2019-06-10 NOTE — PROGRESS NOTES
Subjective     Tyson Reese is a 69 year old male who presents to clinic today for the following health issues:    HPI   Diabetes Follow-up      How often are you checking your blood sugar? Not at all    What time of day are you checking your blood sugars (select all that apply)?      Have you had any blood sugars above 200?      Have you had any blood sugars below 70?      What symptoms do you notice when your blood sugar is low?  None    What concerns do you have today about your diabetes? None     Do you have any of these symptoms? (Select all that apply)  No numbness or tingling in feet.  No redness, sores or blisters on feet.  No complaints of excessive thirst.  No reports of blurry vision.  No significant changes to weight.     Have you had a diabetic eye exam in the last 12 months? Yes- Date of last eye exam: 12/6/18    BP Readings from Last 2 Encounters:   11/12/18 121/72   05/14/18 105/66     Hemoglobin A1C (%)   Date Value   11/12/2018 6.1 (H)   05/14/2018 6.2 (H)     LDL Cholesterol Calculated (mg/dL)   Date Value   05/14/2018 57   05/11/2017 67       Diabetes Management Resources    Amount of exercise or physical activity: 2-3 days/week for an average of 15-30 minutes    Problems taking medications regularly: No    Medication side effects: none    Diet: diabetic          Current Outpatient Medications   Medication Sig Dispense Refill     aspirin 81 MG EC tablet Take 81 mg by mouth daily.       lisinopril-hydrochlorothiazide (PRINZIDE/ZESTORETIC) 20-12.5 MG tablet TAKE 1 TABLET BY MOUTH DAILY 90 tablet 3     metFORMIN (GLUCOPHAGE) 500 MG tablet Take 1 tablet (500 mg) by mouth 2 times daily (with meals) 180 tablet 3     simvastatin (ZOCOR) 40 MG tablet TAKE 1 TABLET (40 MG) BY MOUTH AT BEDTIME (Patient not taking: Reported on 6/10/2019) 30 tablet 0     No Known Allergies  Recent Labs   Lab Test 11/12/18  1356 05/14/18  1407 11/13/17  1413 05/11/17  1355  05/09/16  1114  11/07/14  1243   A1C 6.1* 6.2* 5.8  6.1*   < > 6.1*   < > 6.0   LDL  --  57  --  67  --  47   < > 64   HDL  --  41  --  40  --  38*   < > 42   TRIG  --  176*  --  170*  --  146   < > 165*   ALT  --  19  --  20  --  15   < > 20   CR 0.85 0.99 1.00 0.88   < > 0.81   < > 0.93   GFRESTIMATED 88 75 74 85   < > >90  Non  GFR Calc     < > 81   GFRESTBLACK >90 >90 90 >90  African American GFR Calc     < > >90   GFR Calc     < > >90   GFR Calc     POTASSIUM 4.5 Canceled, Test credited 4.2 3.9   < > 4.2   < > 4.2   TSH  --   --  1.00  --   --   --   --  1.26    < > = values in this interval not displayed.      BP Readings from Last 3 Encounters:   06/10/19 116/70   11/12/18 121/72   05/14/18 105/66    Wt Readings from Last 3 Encounters:   06/10/19 67.1 kg (148 lb)   11/12/18 70.3 kg (155 lb)   05/14/18 70.8 kg (156 lb)                last saw an eye doctor one month ago     Reviewed and updated as needed this visit by Provider         Review of Systems   ROS COMP: CONSTITUTIONAL: NEGATIVE for fever, chills, change in weight  INTEGUMENTARY/SKIN: NEGATIVE for worrisome rashes, moles or lesions  EYES: NEGATIVE for vision changes or irritation  ENT/MOUTH: NEGATIVE for ear, mouth and throat problems  RESP: NEGATIVE for significant cough or SOB  BREAST: NEGATIVE for masses, tenderness or discharge  CV: NEGATIVE for chest pain, palpitations or peripheral edema  GI: NEGATIVE for nausea, abdominal pain, heartburn, or change in bowel habits  : NEGATIVE for frequency, dysuria, or hematuria  MUSCULOSKELETAL: NEGATIVE for significant arthralgias or myalgia  NEURO: NEGATIVE for weakness, dizziness or paresthesias  ENDOCRINE: NEGATIVE for temperature intolerance, skin/hair changes  HEME: NEGATIVE for bleeding problems  PSYCHIATRIC: NEGATIVE for changes in mood or affect      Objective    /70 (BP Location: Left arm, Patient Position: Sitting, Cuff Size: Adult Regular)   Pulse 88   Temp 96.8  F (36  C) (Oral)   Ht 1.657  "m (5' 5.25\")   Wt 67.1 kg (148 lb)   SpO2 94%   BMI 24.44 kg/m    Body mass index is 24.44 kg/m .  Physical Exam   GENERAL: healthy, alert and no distress  EYES: Eyes grossly normal to inspection, PERRL and conjunctivae and sclerae normal  HENT: ear canals and TM's normal, nose and mouth without ulcers or lesions  NECK: no adenopathy, no asymmetry, masses, or scars and thyroid normal to palpation  RESP: lungs clear to auscultation - no rales, rhonchi or wheezes  CV: regular rate and rhythm, normal S1 S2, no S3 or S4, no murmur, click or rub, no peripheral edema and peripheral pulses strong  ABDOMEN: soft, nontender, no hepatosplenomegaly, no masses and bowel sounds normal  MS: no gross musculoskeletal defects noted, no edema  SKIN: no suspicious lesions or rashes  NEURO: Normal strength and tone, mentation intact and speech normal  PSYCH: mentation appears normal, affect normal/bright        ICD-10-CM    1. Diabetes mellitus without complication (H) E11.9 HEMOGLOBIN A1C     BASIC METABOLIC PANEL     Lipid panel reflex to direct LDL Fasting     Albumin Random Urine Quantitative with Creat Ratio     metFORMIN (GLUCOPHAGE) 500 MG tablet   2. Hyperlipidemia LDL goal <100 E78.5 simvastatin (ZOCOR) 40 MG tablet   3. Screening for hyperlipidemia Z13.220 Lipid panel reflex to direct LDL Fasting       Good control in past regarding diabetes   Discussed smoking cessation   Smokes 1/2 ppd   Given quit plan to get patches               "

## 2019-06-11 NOTE — RESULT ENCOUNTER NOTE
Hemoglobin A1c shows good control of your diabetes.  Your cholesterols are normal except for mild elevation of your triglycerides.  Your kidney function tests normal, slightly low sodium and this is probably related to your blood pressure pill.  Perhaps ease up on your sodium restriction if you are doing that.  Follow-up in 6 months as previously recommended.

## 2019-12-10 ENCOUNTER — OFFICE VISIT (OUTPATIENT)
Dept: FAMILY MEDICINE | Facility: CLINIC | Age: 69
End: 2019-12-10
Payer: COMMERCIAL

## 2019-12-10 VITALS
WEIGHT: 158.5 LBS | TEMPERATURE: 97.2 F | DIASTOLIC BLOOD PRESSURE: 73 MMHG | SYSTOLIC BLOOD PRESSURE: 134 MMHG | BODY MASS INDEX: 26.17 KG/M2 | OXYGEN SATURATION: 97 % | HEART RATE: 74 BPM

## 2019-12-10 DIAGNOSIS — E11.9 DIABETES MELLITUS WITHOUT COMPLICATION (H): Primary | ICD-10-CM

## 2019-12-10 DIAGNOSIS — Z23 NEED FOR SHINGLES VACCINE: ICD-10-CM

## 2019-12-10 DIAGNOSIS — Z12.11 SPECIAL SCREENING FOR MALIGNANT NEOPLASMS, COLON: ICD-10-CM

## 2019-12-10 DIAGNOSIS — E78.5 HYPERLIPIDEMIA LDL GOAL <100: ICD-10-CM

## 2019-12-10 LAB
ANION GAP SERPL CALCULATED.3IONS-SCNC: 8 MMOL/L (ref 3–14)
BUN SERPL-MCNC: 14 MG/DL (ref 7–30)
CALCIUM SERPL-MCNC: 9.3 MG/DL (ref 8.5–10.1)
CHLORIDE SERPL-SCNC: 104 MMOL/L (ref 94–109)
CO2 SERPL-SCNC: 25 MMOL/L (ref 20–32)
CREAT SERPL-MCNC: 0.82 MG/DL (ref 0.66–1.25)
CREAT UR-MCNC: 80 MG/DL
GFR SERPL CREATININE-BSD FRML MDRD: 88 ML/MIN/{1.73_M2}
GLUCOSE SERPL-MCNC: 104 MG/DL (ref 70–99)
HBA1C MFR BLD: 6.1 % (ref 0–5.6)
MICROALBUMIN UR-MCNC: 23 MG/L
MICROALBUMIN/CREAT UR: 28.45 MG/G CR (ref 0–17)
POTASSIUM SERPL-SCNC: 4.1 MMOL/L (ref 3.4–5.3)
SODIUM SERPL-SCNC: 137 MMOL/L (ref 133–144)
TSH SERPL DL<=0.005 MIU/L-ACNC: 1.31 MU/L (ref 0.4–4)

## 2019-12-10 PROCEDURE — 99214 OFFICE O/P EST MOD 30 MIN: CPT | Performed by: FAMILY MEDICINE

## 2019-12-10 PROCEDURE — 82043 UR ALBUMIN QUANTITATIVE: CPT | Performed by: FAMILY MEDICINE

## 2019-12-10 PROCEDURE — 83036 HEMOGLOBIN GLYCOSYLATED A1C: CPT | Performed by: FAMILY MEDICINE

## 2019-12-10 PROCEDURE — 36415 COLL VENOUS BLD VENIPUNCTURE: CPT | Performed by: FAMILY MEDICINE

## 2019-12-10 PROCEDURE — 84443 ASSAY THYROID STIM HORMONE: CPT | Performed by: FAMILY MEDICINE

## 2019-12-10 PROCEDURE — 80048 BASIC METABOLIC PNL TOTAL CA: CPT | Performed by: FAMILY MEDICINE

## 2019-12-10 RX ORDER — SIMVASTATIN 40 MG
40 TABLET ORAL AT BEDTIME
Qty: 90 TABLET | Refills: 3 | Status: SHIPPED | OUTPATIENT
Start: 2019-12-10

## 2019-12-10 NOTE — LETTER
[facility]   [address] [place]  [address]  [phone]                                   December 13, 2019    Tyson Reese  [address] [facility] [address]        Dear Tyson,    Your microalbumin shows a very small amount of protein loss in your urine.  This should be followed yearly.  It is not of a concern at this level.    Your thyroid test is normal.    Your electrolytes and kidney function tests are normal otherwise your glucose minimally elevated on the day that he came in.    Your hemoglobin A1c shows good control of your diabetes.  Recheck in 6 months.    Results for orders placed or performed in visit on 12/10/19   Hemoglobin A1c     Status: Abnormal   Result Value Ref Range    Hemoglobin A1C 6.1 (H) 0 - 5.6 %   TSH with free T4 reflex     Status: None   Result Value Ref Range    TSH 1.31 0.40 - 4.00 mU/L   Basic metabolic panel     Status: Abnormal   Result Value Ref Range    Sodium 137 133 - 144 mmol/L    Potassium 4.1 3.4 - 5.3 mmol/L    Chloride 104 94 - 109 mmol/L    Carbon Dioxide 25 20 - 32 mmol/L    Anion Gap 8 3 - 14 mmol/L    Glucose 104 (H) 70 - 99 mg/dL    Urea Nitrogen 14 7 - 30 mg/dL    Creatinine 0.82 0.66 - 1.25 mg/dL    GFR Estimate 88 >60 mL/min/[1.73_m2]    GFR Estimate If Black >90 >60 mL/min/[1.73_m2]    Calcium 9.3 8.5 - 10.1 mg/dL   Albumin Random Urine Quantitative with Creat Ratio     Status: Abnormal   Result Value Ref Range    Creatinine Urine 80 mg/dL    Albumin Urine mg/L 23 mg/L    Albumin Urine mg/g Cr 28.45 (H) 0 - 17 mg/g Cr       If you have any questions please call the clinic at [phone]    Sincerely,    Edd Carvalho MD  bmd

## 2019-12-10 NOTE — PROGRESS NOTES
Subjective     Tyson Reese is a 69 year old male who presents to clinic today for the following health issues:    HPI     Diabetes Follow-up      How often are you checking your blood sugar? Not at all    What concerns do you have today about your diabetes? None     Do you have any of these symptoms? (Select all that apply)  No numbness or tingling in feet.  No redness, sores or blisters on feet.  No complaints of excessive thirst.  No reports of blurry vision.  No significant changes to weight.     Have you had a diabetic eye exam in the last 12 months? No    BP Readings from Last 2 Encounters:   06/10/19 116/70   11/12/18 121/72     Hemoglobin A1C (%)   Date Value   06/10/2019 6.1 (H)   11/12/2018 6.1 (H)     LDL Cholesterol Calculated (mg/dL)   Date Value   06/10/2019 53   05/14/2018 57       Diabetes Management Resources      How many servings of fruits and vegetables do you eat daily?  0-1    On average, how many sweetened beverages do you drink each day (Examples: soda, juice, sweet tea, etc.  Do NOT count diet or artificially sweetened beverages)?   0    How many days per week do you miss taking your medication? 0-1      Reviewed and updated as needed this visit by Provider      Ros: no chest pain, chest tightness   No sob   No cough   No leg edema   No abdominal pain     Denies fever or chills   Weight stable       No foot problems   No claudication     Patient smokes ; he says he is cutting back   He is not ready to quit     He says he is not hard of hearing     O: /73 (BP Location: Left arm, Patient Position: Sitting, Cuff Size: Adult Regular)   Pulse 74   Temp 97.2  F (36.2  C) (Oral)   Wt 71.9 kg (158 lb 8 oz)   SpO2 97%   BMI 26.17 kg/m      Head: Normocephalic, atraumatic.  Eyes: Conjunctiva clear, non icteric. PERRLA.  Ears: External ears and TMs normal BL.  Nose: Septum midline, nasal mucosa pink and moist. No discharge.  Mouth / Throat: Normal dentition.  No oral lesions. Pharynx non  erythematous, tonsils without hypertrophy.  Neck: Supple, no enlarged LN, trachea midline.    No bruits in the neck     Chest wall normal to inspection and palpation. Good excursion bilaterally. Lungs clear to auscultation. Good air movement bilaterally without rales, wheezes, or rhonchi.   Regular rate and  rhythm. S1 and S2 normal, no murmurs, clicks, gallops or rubs. No edema or JVD.    The abdomen is soft without tenderness, guarding, mass, rebound or organomegaly. Bowel sounds are normal. No CVA tenderness or inguinal adenopathy noted.     normal DP and PT pulses, no trophic changes or ulcerative lesions, normal sensory exam and normal monofilament exam        ICD-10-CM    1. Diabetes mellitus without complication (H) E11.9 metFORMIN (GLUCOPHAGE) 500 MG tablet     Hemoglobin A1c     TSH with free T4 reflex     Basic metabolic panel     Albumin Random Urine Quantitative with Creat Ratio   2. Hyperlipidemia LDL goal <100 E78.5 simvastatin (ZOCOR) 40 MG tablet   3. Need for shingles vaccine Z23 zoster vaccine recombinant adjuvanted (SHINGRIX) injection   4. Special screening for malignant neoplasms, colon Z12.11 GASTROENTEROLOGY ADULT REF PROCEDURE ONLY [facility] [phone]; No Provider Preference     Follow up in 6 months for his Diabetes   Encourage smoking cessation   Encouraged to get shingles vaccine and rx written to take to pharmacy

## 2019-12-13 NOTE — RESULT ENCOUNTER NOTE
Your microalbumin shows a very small amount of protein loss in your urine.  This should be followed yearly.  It is not of a concern at this level.    Your thyroid test is normal.    Your electrolytes and kidney function tests are normal otherwise your glucose minimally elevated on the day that he came in.    Your hemoglobin A1c shows good control of your diabetes.  Recheck in 6 months.

## 2020-04-08 ENCOUNTER — TRANSFERRED RECORDS (OUTPATIENT)
Dept: HEALTH INFORMATION MANAGEMENT | Facility: CLINIC | Age: 70
End: 2020-04-08

## 2020-04-15 ENCOUNTER — TELEPHONE (OUTPATIENT)
Dept: FAMILY MEDICINE | Facility: CLINIC | Age: 70
End: 2020-04-15

## 2020-04-15 NOTE — TELEPHONE ENCOUNTER
Reason for Call:  Other - Home Care    Detailed comments: Edith, with [facility], called to inform the care team that patient was discharged from the hospital yesterday with pneumonia. He was negative for COVID-19. Edith admitted patient to home care today. She would like to know if Dr. Najera would follow patient for home care.    She also asked if the care team can call patient to schedule a 5 day hospital follow up with patient.    Phone Number Patient can be reached at: Edith: [phone]    Best Time: Anytime    Can we leave a detailed message on this number? YES    Call taken on 4/15/2020 at 1:19 PM by Adriana Santana

## 2020-04-15 NOTE — TELEPHONE ENCOUNTER
Routed to Dr. Naejra, will you follow patient for home care?  Also, needs hospital follow up; in person or phone visit advised?    Phyllis Queen RN  [facility]

## 2020-04-15 NOTE — TELEPHONE ENCOUNTER
I called Edith and advised her Dr. Najera will follow for home care.   Called to schedule phone visit, older brother Aidan answered, patient with some cognitive issues so family takes his calls and will speak to provider on Tyson's behalf.   Dr. Najera not in clinic after this week so scheduled for this Friday.    Phyllis Queen RN  [facility]

## 2020-04-24 ENCOUNTER — TELEPHONE (OUTPATIENT)
Dept: FAMILY MEDICINE | Facility: CLINIC | Age: 70
End: 2020-04-24

## 2020-04-24 NOTE — TELEPHONE ENCOUNTER
Forms received from: [facility]   Phone number listed: [phone]   Fax listed: [phone]  Date received: 04/24/2020  Form description: orders  Once forms are completed, please return to [facility] [place] via fax.  Form placed: in providers folder  Fanta Murillo

## 2020-05-09 ENCOUNTER — MEDICAL CORRESPONDENCE (OUTPATIENT)
Dept: HEALTH INFORMATION MANAGEMENT | Facility: CLINIC | Age: 70
End: 2020-05-09

## 2020-05-12 ENCOUNTER — TELEPHONE (OUTPATIENT)
Dept: FAMILY MEDICINE | Facility: CLINIC | Age: 70
End: 2020-05-12

## 2020-05-12 NOTE — TELEPHONE ENCOUNTER
Forms received from: [facility]   Phone number listed: [phone]   Fax listed: [phone]  Date received: 05.12.20  Form description: Discharge  Once forms are completed, please return to [facility] via Fax.  Is patient requesting to be contacted when forms are completed: NA    Form placed: in providers merrill Fallon

## 2020-05-12 NOTE — TELEPHONE ENCOUNTER
Requested information faxed to number provided.  [facility]  Team 3 Coordinator

## 2020-06-05 ENCOUNTER — TELEPHONE (OUTPATIENT)
Dept: FAMILY MEDICINE | Facility: CLINIC | Age: 70
End: 2020-06-05

## 2020-06-05 NOTE — TELEPHONE ENCOUNTER
Can a nurse or someone get Oxygen sat.            With Sitting,           With Ambulation for at least 6 minutes and during sleeping  Thanks

## 2020-06-05 NOTE — TELEPHONE ENCOUNTER
Attempt # 1  Called  Jeff/ brother of patient [phone]      Did patient answer the phone: No, left a message on voicemail to return call to triage line at [phone].    Joselyn MarquezRN,BSN  [facility]

## 2020-06-05 NOTE — TELEPHONE ENCOUNTER
Reason for Call:  Other     Detailed comments:  Patients brother is calling on behalf of patient. Patient was in the hospital and was released a few weeks ago.  He was released home with oxygen. Patient does not need the oxygen anymore and [employer] (who oxygen is through) is saying that they need a letter from his PCP stating that he does not need oxygen anymore. Please call to discuss what patient needs to do. Patient PCP was Dr FORD.    Phone Number Patient can be reached at: Home number on file [phone] (home)    Best Time: any    Can we leave a detailed message on this number? YES    Call taken on 6/5/2020 at 11:49 AM by Ene Rahman

## 2020-06-08 NOTE — TELEPHONE ENCOUNTER
Attempt # 2   Called    Jeff/ brother of patient [phone]   Left a message on voicemail to return call to triage line at [phone].    Joselyn MarquezRN,BSN  [facility]

## 2020-06-09 NOTE — TELEPHONE ENCOUNTER
Called [facility]   Phone number listed: [phone] to see if patient still has home care - plan to request home care nurse check oxygen per Dr. Najera request.  Paola carl was discharged form [facility] on 5/9/2020 SATS at that time 91%.     Attempt # 3  Called patient at home number.[phone] unable to leave message recording   - tried [phone] recording in a different area code. Tried [phone] again a woman answered - patient and brother not home at this time - left message with female to have patient or brother call clinic.        Cierra Arreguin RN  [facility]

## 2020-06-09 NOTE — TELEPHONE ENCOUNTER
Brother Jeff called back to RN line which I picked up.   He says Tyson is doing well off the oxygen, does not appear short of breath and can walk short distances.    Tyson is having a CT of his lungs today in [facility] system.  Jeff says they don't have a follow up with pulmonology as far as he knows.    I advised they should be contacted by whoever ordered the CT for follow up and results and assume they would address any need for oxygen.      Jeff verbalized understanding and agreement.    Routed to Dr. Najera as FYI.   Patient has not seen Dr. Najera yet.    Phyllis Queen RN  [facility]